# Patient Record
Sex: MALE | Race: WHITE | Employment: UNEMPLOYED | URBAN - METROPOLITAN AREA
[De-identification: names, ages, dates, MRNs, and addresses within clinical notes are randomized per-mention and may not be internally consistent; named-entity substitution may affect disease eponyms.]

---

## 2019-02-27 ENCOUNTER — HOSPITAL ENCOUNTER (OUTPATIENT)
Age: 34
Setting detail: SPECIMEN
Discharge: HOME | End: 2019-02-27
Payer: COMMERCIAL

## 2019-02-27 LAB
ABSOLUTE EOS #: 0.35 K/UL (ref 0–0.44)
ABSOLUTE IMMATURE GRANULOCYTE: 0.04 K/UL (ref 0–0.3)
ABSOLUTE LYMPH #: 2.37 K/UL (ref 1.1–3.7)
ABSOLUTE MONO #: 0.68 K/UL (ref 0.1–1.2)
BASOPHILS # BLD: 1 % (ref 0–2)
BASOPHILS ABSOLUTE: 0.06 K/UL (ref 0–0.2)
DIFFERENTIAL TYPE: ABNORMAL
EOSINOPHILS RELATIVE PERCENT: 4 % (ref 1–4)
HCT VFR BLD CALC: 51.1 % (ref 40.7–50.3)
HEMOGLOBIN: 17.7 G/DL (ref 13–17)
IMMATURE GRANULOCYTES: 0 %
LYMPHOCYTES # BLD: 27 % (ref 24–43)
MCH RBC QN AUTO: 32.4 PG (ref 25.2–33.5)
MCHC RBC AUTO-ENTMCNC: 34.6 G/DL (ref 28.4–34.8)
MCV RBC AUTO: 93.4 FL (ref 82.6–102.9)
MONOCYTES # BLD: 8 % (ref 3–12)
NRBC AUTOMATED: 0 PER 100 WBC
PDW BLD-RTO: 12.4 % (ref 11.8–14.4)
PLATELET # BLD: 306 K/UL (ref 138–453)
PLATELET ESTIMATE: ABNORMAL
PMV BLD AUTO: 11.3 FL (ref 8.1–13.5)
RBC # BLD: 5.47 M/UL (ref 4.21–5.77)
RBC # BLD: ABNORMAL 10*6/UL
SEG NEUTROPHILS: 60 % (ref 36–65)
SEGMENTED NEUTROPHILS ABSOLUTE COUNT: 5.4 K/UL (ref 1.5–8.1)
WBC # BLD: 8.9 K/UL (ref 3.5–11.3)
WBC # BLD: ABNORMAL 10*3/UL

## 2019-02-28 LAB
ALBUMIN SERPL-MCNC: 4.4 G/DL (ref 3.5–5.2)
ALBUMIN/GLOBULIN RATIO: 1.6 (ref 1–2.5)
ALP BLD-CCNC: 92 U/L (ref 40–129)
ALT SERPL-CCNC: 26 U/L (ref 5–41)
ANION GAP SERPL CALCULATED.3IONS-SCNC: 11 MMOL/L (ref 9–17)
AST SERPL-CCNC: 25 U/L
BILIRUB SERPL-MCNC: 1.15 MG/DL (ref 0.3–1.2)
BUN BLDV-MCNC: 15 MG/DL (ref 6–20)
BUN/CREAT BLD: ABNORMAL (ref 9–20)
CALCIUM SERPL-MCNC: 9 MG/DL (ref 8.6–10.4)
CHLORIDE BLD-SCNC: 102 MMOL/L (ref 98–107)
CHOLESTEROL/HDL RATIO: 4.8
CHOLESTEROL: 177 MG/DL
CO2: 26 MMOL/L (ref 20–31)
CREAT SERPL-MCNC: 0.82 MG/DL (ref 0.7–1.2)
GFR AFRICAN AMERICAN: >60 ML/MIN
GFR NON-AFRICAN AMERICAN: >60 ML/MIN
GFR SERPL CREATININE-BSD FRML MDRD: ABNORMAL ML/MIN/{1.73_M2}
GFR SERPL CREATININE-BSD FRML MDRD: ABNORMAL ML/MIN/{1.73_M2}
GLUCOSE BLD-MCNC: 108 MG/DL (ref 70–99)
HDLC SERPL-MCNC: 37 MG/DL
LDL CHOLESTEROL: 112 MG/DL (ref 0–130)
POTASSIUM SERPL-SCNC: 4.3 MMOL/L (ref 3.7–5.3)
SODIUM BLD-SCNC: 139 MMOL/L (ref 135–144)
TOTAL PROTEIN: 7.1 G/DL (ref 6.4–8.3)
TRIGL SERPL-MCNC: 142 MG/DL
TSH SERPL DL<=0.05 MIU/L-ACNC: 2.21 MIU/L (ref 0.3–5)
VLDLC SERPL CALC-MCNC: ABNORMAL MG/DL (ref 1–30)

## 2019-07-30 ENCOUNTER — HOSPITAL ENCOUNTER (OUTPATIENT)
Age: 34
Setting detail: SPECIMEN
Discharge: HOME | End: 2019-07-30
Payer: COMMERCIAL

## 2019-07-30 LAB
ABSOLUTE EOS #: 0.25 K/UL (ref 0–0.44)
ABSOLUTE IMMATURE GRANULOCYTE: <0.03 K/UL (ref 0–0.3)
ABSOLUTE LYMPH #: 2.33 K/UL (ref 1.1–3.7)
ABSOLUTE MONO #: 0.58 K/UL (ref 0.1–1.2)
BASOPHILS # BLD: 1 % (ref 0–2)
BASOPHILS ABSOLUTE: 0.06 K/UL (ref 0–0.2)
DIFFERENTIAL TYPE: ABNORMAL
EOSINOPHILS RELATIVE PERCENT: 3 % (ref 1–4)
HCT VFR BLD CALC: 50.2 % (ref 40.7–50.3)
HEMOGLOBIN: 17.1 G/DL (ref 13–17)
IMMATURE GRANULOCYTES: 0 %
LYMPHOCYTES # BLD: 25 % (ref 24–43)
MCH RBC QN AUTO: 31.5 PG (ref 25.2–33.5)
MCHC RBC AUTO-ENTMCNC: 34.1 G/DL (ref 28.4–34.8)
MCV RBC AUTO: 92.6 FL (ref 82.6–102.9)
MONOCYTES # BLD: 6 % (ref 3–12)
NRBC AUTOMATED: 0 PER 100 WBC
PDW BLD-RTO: 11.9 % (ref 11.8–14.4)
PLATELET # BLD: 284 K/UL (ref 138–453)
PLATELET ESTIMATE: ABNORMAL
PMV BLD AUTO: 11.9 FL (ref 8.1–13.5)
RBC # BLD: 5.42 M/UL (ref 4.21–5.77)
RBC # BLD: ABNORMAL 10*6/UL
SEG NEUTROPHILS: 65 % (ref 36–65)
SEGMENTED NEUTROPHILS ABSOLUTE COUNT: 5.92 K/UL (ref 1.5–8.1)
WBC # BLD: 9.2 K/UL (ref 3.5–11.3)
WBC # BLD: ABNORMAL 10*3/UL

## 2019-07-31 LAB
ALBUMIN SERPL-MCNC: 4.6 G/DL (ref 3.5–5.2)
ALBUMIN/GLOBULIN RATIO: 1.5 (ref 1–2.5)
ALP BLD-CCNC: 83 U/L (ref 40–129)
ALT SERPL-CCNC: 18 U/L (ref 5–41)
ANION GAP SERPL CALCULATED.3IONS-SCNC: 11 MMOL/L (ref 9–17)
AST SERPL-CCNC: 19 U/L
BILIRUB SERPL-MCNC: 1.15 MG/DL (ref 0.3–1.2)
BUN BLDV-MCNC: 12 MG/DL (ref 6–20)
BUN/CREAT BLD: ABNORMAL (ref 9–20)
CALCIUM SERPL-MCNC: 9.4 MG/DL (ref 8.6–10.4)
CHLORIDE BLD-SCNC: 103 MMOL/L (ref 98–107)
CHOLESTEROL, FASTING: 173 MG/DL
CHOLESTEROL/HDL RATIO: 4.8
CO2: 27 MMOL/L (ref 20–31)
CREAT SERPL-MCNC: 0.74 MG/DL (ref 0.7–1.2)
GFR AFRICAN AMERICAN: >60 ML/MIN
GFR NON-AFRICAN AMERICAN: >60 ML/MIN
GFR SERPL CREATININE-BSD FRML MDRD: ABNORMAL ML/MIN/{1.73_M2}
GFR SERPL CREATININE-BSD FRML MDRD: ABNORMAL ML/MIN/{1.73_M2}
GLUCOSE BLD-MCNC: 162 MG/DL (ref 70–99)
HDLC SERPL-MCNC: 36 MG/DL
LDL CHOLESTEROL: 108 MG/DL (ref 0–130)
POTASSIUM SERPL-SCNC: 4.1 MMOL/L (ref 3.7–5.3)
SODIUM BLD-SCNC: 141 MMOL/L (ref 135–144)
TOTAL PROTEIN: 7.6 G/DL (ref 6.4–8.3)
TRIGLYCERIDE, FASTING: 145 MG/DL
TSH SERPL DL<=0.05 MIU/L-ACNC: 0.85 MIU/L (ref 0.3–5)
VLDLC SERPL CALC-MCNC: ABNORMAL MG/DL (ref 1–30)

## 2021-06-22 ENCOUNTER — HOSPITAL ENCOUNTER (OUTPATIENT)
Age: 36
Setting detail: SPECIMEN
Discharge: HOME | End: 2021-06-22
Payer: COMMERCIAL

## 2021-06-24 LAB
CULTURE: ABNORMAL
DIRECT EXAM: ABNORMAL
Lab: ABNORMAL
SPECIMEN DESCRIPTION: ABNORMAL

## 2021-07-29 ENCOUNTER — TELEPHONE (OUTPATIENT)
Dept: SURGERY | Age: 36
End: 2021-07-29

## 2021-08-03 ENCOUNTER — OFFICE VISIT (OUTPATIENT)
Dept: SURGERY | Age: 36
End: 2021-08-03
Payer: COMMERCIAL

## 2021-08-03 VITALS
RESPIRATION RATE: 15 BRPM | WEIGHT: 249 LBS | DIASTOLIC BLOOD PRESSURE: 90 MMHG | TEMPERATURE: 97.2 F | HEIGHT: 74 IN | OXYGEN SATURATION: 100 % | HEART RATE: 77 BPM | BODY MASS INDEX: 31.95 KG/M2 | SYSTOLIC BLOOD PRESSURE: 122 MMHG

## 2021-08-03 DIAGNOSIS — K43.2 INCISIONAL HERNIA, WITHOUT OBSTRUCTION OR GANGRENE: Primary | ICD-10-CM

## 2021-08-03 DIAGNOSIS — Z01.818 PRE-OP TESTING: ICD-10-CM

## 2021-08-03 PROCEDURE — G8417 CALC BMI ABV UP PARAM F/U: HCPCS | Performed by: SURGERY

## 2021-08-03 PROCEDURE — G8427 DOCREV CUR MEDS BY ELIG CLIN: HCPCS | Performed by: SURGERY

## 2021-08-03 PROCEDURE — 99244 OFF/OP CNSLTJ NEW/EST MOD 40: CPT | Performed by: SURGERY

## 2021-08-03 RX ORDER — IBUPROFEN 200 MG
200 TABLET ORAL EVERY 6 HOURS PRN
COMMUNITY

## 2021-08-03 RX ORDER — ONDANSETRON 4 MG/1
4 TABLET, ORALLY DISINTEGRATING ORAL EVERY 8 HOURS PRN
COMMUNITY

## 2021-08-03 RX ORDER — ACETAMINOPHEN 325 MG/1
650 TABLET ORAL EVERY 6 HOURS PRN
COMMUNITY

## 2021-08-03 RX ORDER — TIZANIDINE 4 MG/1
4 TABLET ORAL EVERY 6 HOURS PRN
COMMUNITY

## 2021-08-03 RX ORDER — INSULIN GLARGINE 100 [IU]/ML
INJECTION, SOLUTION SUBCUTANEOUS NIGHTLY
COMMUNITY

## 2021-08-03 ASSESSMENT — ENCOUNTER SYMPTOMS
COUGH: 0
WHEEZING: 0
VOICE CHANGE: 0
NAUSEA: 1
ABDOMINAL DISTENTION: 1
SINUS PRESSURE: 0
VOMITING: 0
CHOKING: 0
STRIDOR: 0
TROUBLE SWALLOWING: 0
CHEST TIGHTNESS: 0
BACK PAIN: 1
PHOTOPHOBIA: 0
EYE PAIN: 0
BLOOD IN STOOL: 0
SHORTNESS OF BREATH: 0
RECTAL PAIN: 0
ABDOMINAL PAIN: 1
FACIAL SWELLING: 0
EYE ITCHING: 0
SINUS PAIN: 0
EYE REDNESS: 0
ANAL BLEEDING: 0
RHINORRHEA: 0
EYE DISCHARGE: 0
APNEA: 0
CONSTIPATION: 0
DIARRHEA: 0
COLOR CHANGE: 0
SORE THROAT: 0

## 2021-08-03 NOTE — PROGRESS NOTES
Asia Perdomo. [facility], [address] [place] [address] [address] [place] [id number]  [phone]  New Patient Evaluation in Office    Pt Name: Susi Khoury  Date of Birth 1985   Today's Date: 8/3/2021  Medical Record Number: [id number]  Referring Provider: MARY Mahmood *  Primary Care Provider: MARY Barnes - NP  Chief Complaint   Patient presents with   [facility]     new patient--ref by GEN Grubbs for umbilical hernia     ASSESSMENT       Diagnosis Orders   1. Incisional hernia, without obstruction or gangrene  LAPAROSCOPY REPAIR HERNIA UMBILICAL / VENTRAL / INCISIONAL   2. Pre-op testing       Past Medical History:   Diagnosis Date    Seizures ([address]     Type 2 diabetes mellitus ([facility] [address]           PLANS      1. Schedule Dennis Isaacs for L/S Incisional hernia repair  2. The risks, options and alternatives were discussed in the office. Bleeding, infection, reoperation and recurrence were discussed. Injury to other intra abdominal structures/organs and possible conversion to an open procedure as well as the risks of mesh infection were covered. The patient was given the opportunity to ask questions. Once answered, the patient was agreeable to proceed with the surgery. 3. Status: outpatient  4. Planned anesthesia: general  5. He will undergo pre-operative clearance per anesthesia guidelines with risk factors listed under the past medical history diagnosis & problem list.     Giovanna Goins is a 31 y.o.male who presents with abdominal pain. The pain is described as dull, and is moderate in intensity. Pain is located in the epigastric with radiation to the Left upper quadrant. He says that the pain becomes sharp with lifting and movement to an 8/10 pain. Onset was 2 months ago. Symptoms have been gradually worsening since. Aggravating factors include activity. Alleviating factors include none. Associated symptoms include nausea and heartburn. He admits to history of fevers, umbilical discharge, past hiatal hernia surgery and past tumor removal surgeries. Previous studies include CT scan which was essentially unremarkable. Past Medical History  Past Medical History:   Diagnosis Date    Seizures ([facility] [address]     Type 2 diabetes mellitus ([facility] [address]      Past Surgical History  Past Surgical History:   Procedure Laterality Date    FOOT SURGERY Right     benign mass    GASTRIC FUNDOPLICATION  20/61/2856    Dr. Amilcar Felipe (in care everywhere)    OTHER SURGICAL HISTORY      left arm mass removed-- Dr. Amilcar Felipe     Medications  Current Outpatient Medications   Medication Sig Dispense Refill    insulin glargine (LANTUS) 100 UNIT/ML injection vial Inject into the skin nightly 42 in morning 32 at night      insulin lispro (HUMALOG) 100 UNIT/ML injection vial Inject into the skin 3 times daily (before meals)      tiZANidine (ZANAFLEX) 4 MG tablet Take 4 mg by mouth every 6 hours as needed      ondansetron (ZOFRAN-ODT) 4 MG disintegrating tablet Take 4 mg by mouth every 8 hours as needed for Nausea or Vomiting      acetaminophen (TYLENOL) 325 MG tablet Take 650 mg by mouth every 6 hours as needed for Pain      ibuprofen (ADVIL;MOTRIN) 200 MG tablet Take 200 mg by mouth every 6 hours as needed for Pain       No current facility-administered medications for this visit. Allergies  is allergic to dust mite extract, mixed grasses, and molds & smuts. Family History  family history is not on file. Social History   reports that he has never smoked. He has never used smokeless tobacco. He reports that he does not drink alcohol and does not use drugs.   Health Screening Exams  Health Maintenance   Topic Date Due    Hepatitis C screen  Never done    Varicella vaccine (1 of 2 - 2-dose childhood series) Never done    COVID-19 Vaccine (1) Never done    HIV screen  Never done    DTaP/Tdap/Td vaccine (1 - Tdap) Never done    Flu vaccine (1) 09/01/2021    Hepatitis A vaccine  Aged Out  Hepatitis B vaccine  Aged Out    Hib vaccine  Aged Out    Meningococcal (ACWY) vaccine  Aged Out    Pneumococcal 0-64 years Vaccine  Aged Out     Review of Systems  Constitutional: Positive for fever (intermittent). Negative for activity change, appetite change, chills, diaphoresis, fatigue and unexpected weight change. HENT: Negative for congestion, dental problem, drooling, ear discharge, ear pain, facial swelling, hearing loss, mouth sores, nosebleeds, postnasal drip, rhinorrhea, sinus pressure, sinus pain, sneezing, sore throat, tinnitus, trouble swallowing and voice change. Eyes: Negative for photophobia, pain, discharge, redness, itching and visual disturbance. Respiratory: Negative for apnea, cough, choking, chest tightness, shortness of breath, wheezing and stridor. Cardiovascular: Negative for chest pain, palpitations and leg swelling. Gastrointestinal: Positive for abdominal distention, abdominal pain (umbilical/ left lower) and nausea. Negative for anal bleeding, blood in stool, constipation, diarrhea, rectal pain and vomiting. Endocrine: Negative for cold intolerance, heat intolerance, polydipsia, polyphagia and polyuria. Genitourinary: Negative for decreased urine volume, difficulty urinating, discharge, dysuria, enuresis, flank pain, frequency, genital sores, hematuria, penile pain, penile swelling, scrotal swelling, testicular pain and urgency. Musculoskeletal: Positive for back pain (spinal fractures per pt). Negative for arthralgias, gait problem, joint swelling, myalgias, neck pain and neck stiffness. Skin: Negative for color change, pallor, rash and wound. Allergic/Immunologic: Negative for environmental allergies, food allergies and immunocompromised state. Neurological: Positive for seizures (stress induced). Negative for dizziness, tremors, syncope, facial asymmetry, speech difficulty, weakness, light-headedness, numbness and headaches.    Hematological: Negative for adenopathy. Does not bruise/bleed easily. Psychiatric/Behavioral: Negative for agitation, behavioral problems, confusion, decreased concentration, dysphoric mood, hallucinations, self-injury, sleep disturbance and suicidal ideas. The patient is not nervous/anxious and is not hyperactive. OBJECTIVE    VITALS:  height is 6' 2\" (1.88 m) and weight is 249 lb (112.9 kg). His tympanic temperature is 97.2 °F (36.2 °C). His blood pressure is 122/90 (abnormal) and his pulse is 77. His respiration is 15 and oxygen saturation is 100%. Pain Score:   8 Body mass index is 31.97 kg/m². CONSTITUTIONAL: Alert and oriented times 3, no acute distress and cooperative to examination with proper mood and affect. SKIN: Skin color, texture, turgor normal. No rashes or lesions. LYMPH: no cervical nodes, no inguinal nodes  HEENT: Head is normocephalic, atraumatic. EOMI, PERRLA. NECK: Supple, symmetrical, trachea midline, no adenopathy, thyroid symmetric, not enlarged and no tenderness, skin normal.  CHEST/LUNGS: chest symmetric with normal A/P diameter, normal respiratory rate and rhythm, lungs clear to auscultation without wheezes, rales or rhonchi. No accessory muscle use. Scars None   CARDIOVASCULAR: Heart sounds are normal.  Regular rate and rhythm without murmur, gallop or rub. Normal S1 and S2. Carotid and femoral pulses 2+/4 and equal bilaterally. ABDOMEN: Normal shape. Laparoscopic scar(s) present. Normal bowel sounds. No bruits. soft, nondistended, no masses or organomegaly. incisional hernia is present at the supraumbilical port site which is reducible. Percussion: Normal without hepatosplenomegally. RECTAL: deferred, not clinically indicated  NEUROLOGIC: There are no focalizing motor or sensory deficits. CN II-XII are grossly intact. Taryn Evanston EXTREMITIES: no cyanosis, no clubbing and no edema. Thank you for the interesting evaluation. Further recommendations as listed above.        Electronically signed by David Eli, DO on 8/3/2021 at 12:52 PM

## 2021-08-03 NOTE — PROGRESS NOTES
Pt evaluation and documentation discussed with the student. Please see separate attending note for my independent evaluation and management recommendations. Electronically signed by Elizabeth Kirkland DO on 8/3/2021 at 12:50 PM         **This is a Medical/ PA/ APRN Student Note and is charted for educational purposes. The non-physician staff attested note is not to be used for billing purposes or to guide patient care. Please see the physician modifications/ attestation for treatment plan/suggestions. This note has been reviewed and feedback has been provided to the student. **    [address] Surgery History & Physical  Jenny Amin  On behalf of Dr. Florence Xavier    Pt Name: Nino Cantu  MRN: [id number]  YOB: 1985  Date of evaluation: 8/3/2021  Primary Care Physician: MARY Ruiz - NP  Patient evaluated at the request of  Dr. Sabrina Farrell for evaluation: Abdominal pain  IMPRESSIONS   1. Incisional hernia and abdominal pain. 2.  has a past medical history of Seizures ([address] and Type 2 diabetes mellitus ([address] PLANS     SUBJECTIVE   History of Chief Complaint:    Elpidio Rodriguez is a 31 y.o.male who presents with abdominal pain. The pain is described as dull, and is moderate in intensity. Pain is located in the epigastric with radiation to the Left upper quadrant. He says that the pain becomes sharp with lifting and movement to an 8/10 pain. Onset was 2 months ago. Symptoms have been gradually worsening since. Aggravating factors include activity. Alleviating factors include none. Associated symptoms include nausea and heartburn. He admits to history of fevers, umbilical discharge, past hiatal hernia surgery and past tumor removal surgeries. Previous studies include CT scan. Past Medical History   has a past medical history of Seizures ([address] and Type 2 diabetes mellitus ([address]   Past Surgical History   has a past surgical history that includes Gastric fundoplication (33/17/5895); other surgical history; and Foot surgery (Right). Medications  Prior to Admission medications    Medication Sig Start Date End Date Taking? Authorizing Provider   insulin glargine (LANTUS) 100 UNIT/ML injection vial Inject into the skin nightly 42 in morning 32 at night   Yes Historical Provider, MD   insulin lispro (HUMALOG) 100 UNIT/ML injection vial Inject into the skin 3 times daily (before meals)   Yes Historical Provider, MD   tiZANidine (ZANAFLEX) 4 MG tablet Take 4 mg by mouth every 6 hours as needed   Yes Historical Provider, MD   ondansetron (ZOFRAN-ODT) 4 MG disintegrating tablet Take 4 mg by mouth every 8 hours as needed for Nausea or Vomiting   Yes Historical Provider, MD   acetaminophen (TYLENOL) 325 MG tablet Take 650 mg by mouth every 6 hours as needed for Pain   Yes Historical Provider, MD   ibuprofen (ADVIL;MOTRIN) 200 MG tablet Take 200 mg by mouth every 6 hours as needed for Pain   Yes Historical Provider, MD    Scheduled Meds:  Continuous Infusions:  PRN Meds:. Allergies  is allergic to dust mite extract, mixed grasses, and molds & smuts. Family History  family history is not on file. Social History   reports that he has never smoked. He has never used smokeless tobacco. He reports that he does not drink alcohol and does not use drugs. Review of Systems:  General positive for  fevers  Denies any fever or chills  HEENT Denies any diplopia, tinnitus or vertigo  Resp Denies any shortness of breath, cough or wheezing  Cardiac Denies any chest pain, palpitations, claudication or edema  GI Positive for reflux and dysphagia  Denies any melena, hematochezia, hematemesis or pyrosis  Endocrine positive for diabetic symptoms including none and nausea, Denies any history of diabetes or thyroid disease  OBJECTIVE   CURRENT VITALS:  height is 6' 2\" (1.88 m) and weight is 249 lb (112.9 kg). His tympanic temperature is 97.2 °F (36.2 °C). His blood pressure is 122/90 (abnormal) and his pulse is 77.  His respiration is 15 and oxygen saturation is 100%. Body mass index is 31.97 kg/m². Temperature Range (24h):Temp: 97.2 °F (36.2 °C) Temp  Av.2 °F (36.2 °C)  Min: 97.2 °F (36.2 °C)  Max: 97.2 °F (36.2 °C)  BP Range (45F): Systolic (32UEK), INZ:066 , Min:122 , NUP:823     Diastolic (26VAW), SNP:32, Min:90, Max:90    Pulse Range (24h): Pulse  Av  Min: 77  Max: 77  Respiration Range (24h): Resp  Avg: 15  Min: 15  Max: 15  Current Pulse Ox (24h):  SpO2: 100 %  Pulse Ox Range (24h):  SpO2  Av %  Min: 100 %  Max: 100 %  Oxygen Amount and Delivery:    CONSTITUTIONAL: Alert and oriented times 3, no acute distress and cooperative to examination with proper mood and affect. SKIN: Skin color, texture, turgor normal. No rashes or lesions. , positives: scar - arms, abdomen  LYMPH: no cervical nodes, no inguinal nodes  NECK: skin normal.  CHEST/LUNGS: chest symmetric with normal A/P diameter, normal respiratory rate and rhythm, lungs clear to auscultation without wheezes, rales or rhonchi. No accessory muscle use. Scars None   CARDIOVASCULAR: Heart sounds are normal.  Regular rate and rhythm without murmur, gallop or rub. Normal S1 and S2. Carotid and femoral pulses 2+/4 and equal bilaterally. ABDOMEN: obese No and Laparoscopic scar(s) present. Normal bowel sounds. No bruits. Soft, nondistended, no masses or organomegaly. ventral hernia, at supraumbilical port site. Percussion: Normal without hepatosplenomegally. Tenderness: absent. RECTAL: deferred, not clinically indicated  NEUROLOGIC: There are no focalizing motor or sensory deficits. CN II-XII are grossly intact. Rawleigh Billing EXTREMITIES: no cyanosis, no clubbing and no edema. LABS   No results for input(s): WBC, HGB, HCT, PLT, NA, K, CL, CO2, BUN, CREATININE, MG, PHOS, CALCIUM, PTT, INR, AST, ALT, BILITOT, BILIDIR, AMYLASE, LIPASE, LDH, LACTA, NITRU, COLORU, BACTERIA in the last 72 hours.     Invalid input(s): PT, WBCU, RBCU, LEUKOCYTESUA  RADIOLOGY     No orders to display Thank you for the interesting evaluation. Further recommendations to follow. Electronically signed by Lindy Arrieta on 8/3/2021 at 10:03 AM        **This is a Medical/ PA/ APRN Student Note and is charted for educational purposes. The non-physician staff attested note is not to be used for billing purposes or to guide patient care. Please see the physician modifications/ attestation for treatment plan/suggestions. This note has been reviewed and feedback has been provided to the student.  **

## 2021-08-03 NOTE — PROGRESS NOTES
Subjective:      Patient ID: Ezra Teague is a 31 y.o. male. HPI    Review of Systems   Constitutional: Positive for fever (intermittent). Negative for activity change, appetite change, chills, diaphoresis, fatigue and unexpected weight change. HENT: Negative for congestion, dental problem, drooling, ear discharge, ear pain, facial swelling, hearing loss, mouth sores, nosebleeds, postnasal drip, rhinorrhea, sinus pressure, sinus pain, sneezing, sore throat, tinnitus, trouble swallowing and voice change. Eyes: Negative for photophobia, pain, discharge, redness, itching and visual disturbance. Respiratory: Negative for apnea, cough, choking, chest tightness, shortness of breath, wheezing and stridor. Cardiovascular: Negative for chest pain, palpitations and leg swelling. Gastrointestinal: Positive for abdominal distention, abdominal pain (umbilical/ left lower) and nausea. Negative for anal bleeding, blood in stool, constipation, diarrhea, rectal pain and vomiting. Endocrine: Negative for cold intolerance, heat intolerance, polydipsia, polyphagia and polyuria. Genitourinary: Negative for decreased urine volume, difficulty urinating, discharge, dysuria, enuresis, flank pain, frequency, genital sores, hematuria, penile pain, penile swelling, scrotal swelling, testicular pain and urgency. Musculoskeletal: Positive for back pain (spinal fractures per pt). Negative for arthralgias, gait problem, joint swelling, myalgias, neck pain and neck stiffness. Skin: Negative for color change, pallor, rash and wound. Allergic/Immunologic: Negative for environmental allergies, food allergies and immunocompromised state. Neurological: Positive for seizures (stress induced). Negative for dizziness, tremors, syncope, facial asymmetry, speech difficulty, weakness, light-headedness, numbness and headaches. Hematological: Negative for adenopathy. Does not bruise/bleed easily.    Psychiatric/Behavioral: Negative for agitation, behavioral problems, confusion, decreased concentration, dysphoric mood, hallucinations, self-injury, sleep disturbance and suicidal ideas. The patient is not nervous/anxious and is not hyperactive.         Objective:   Physical Exam    Assessment:            Plan:              Gillian Arreola

## 2021-08-11 ENCOUNTER — ANESTHESIA (OUTPATIENT)
Dept: OPERATING ROOM | Age: 36
End: 2021-08-11
Payer: COMMERCIAL

## 2021-08-11 ENCOUNTER — HOSPITAL ENCOUNTER (OUTPATIENT)
Age: 36
Setting detail: OUTPATIENT SURGERY
Discharge: HOME | End: 2021-08-11
Attending: SURGERY | Admitting: SURGERY
Payer: COMMERCIAL

## 2021-08-11 ENCOUNTER — ANESTHESIA EVENT (OUTPATIENT)
Dept: OPERATING ROOM | Age: 36
End: 2021-08-11
Payer: COMMERCIAL

## 2021-08-11 VITALS
WEIGHT: 251 LBS | HEIGHT: 74 IN | RESPIRATION RATE: 20 BRPM | HEART RATE: 85 BPM | BODY MASS INDEX: 32.21 KG/M2 | TEMPERATURE: 96.2 F | OXYGEN SATURATION: 97 % | DIASTOLIC BLOOD PRESSURE: 66 MMHG | SYSTOLIC BLOOD PRESSURE: 119 MMHG

## 2021-08-11 VITALS — DIASTOLIC BLOOD PRESSURE: 61 MMHG | SYSTOLIC BLOOD PRESSURE: 115 MMHG | OXYGEN SATURATION: 96 %

## 2021-08-11 DIAGNOSIS — G89.18 ACUTE POSTOPERATIVE PAIN: Primary | ICD-10-CM

## 2021-08-11 LAB
GLUCOSE BLD-MCNC: 176 MG/DL (ref 70–108)
GLUCOSE BLD-MCNC: 192 MG/DL (ref 70–108)
GLUCOSE BLD-MCNC: 264 MG/DL (ref 70–108)

## 2021-08-11 PROCEDURE — 2500000003 HC RX 250 WO HCPCS: Performed by: SURGERY

## 2021-08-11 PROCEDURE — 2709999900 HC NON-CHARGEABLE SUPPLY: Performed by: SURGERY

## 2021-08-11 PROCEDURE — 7100000000 HC PACU RECOVERY - FIRST 15 MIN: Performed by: SURGERY

## 2021-08-11 PROCEDURE — 7100000010 HC PHASE II RECOVERY - FIRST 15 MIN: Performed by: SURGERY

## 2021-08-11 PROCEDURE — 7100000001 HC PACU RECOVERY - ADDTL 15 MIN: Performed by: SURGERY

## 2021-08-11 PROCEDURE — 82948 REAGENT STRIP/BLOOD GLUCOSE: CPT

## 2021-08-11 PROCEDURE — 6370000000 HC RX 637 (ALT 250 FOR IP): Performed by: SURGERY

## 2021-08-11 PROCEDURE — C1713 ANCHOR/SCREW BN/BN,TIS/BN: HCPCS | Performed by: SURGERY

## 2021-08-11 PROCEDURE — 3700000001 HC ADD 15 MINUTES (ANESTHESIA): Performed by: SURGERY

## 2021-08-11 PROCEDURE — 3700000000 HC ANESTHESIA ATTENDED CARE: Performed by: SURGERY

## 2021-08-11 PROCEDURE — 6360000002 HC RX W HCPCS: Performed by: REGISTERED NURSE

## 2021-08-11 PROCEDURE — 3600000004 HC SURGERY LEVEL 4 BASE: Performed by: SURGERY

## 2021-08-11 PROCEDURE — 49652 PR LAP, VENTRAL HERNIA REPAIR,REDUCIBLE: CPT | Performed by: SURGERY

## 2021-08-11 PROCEDURE — 2500000003 HC RX 250 WO HCPCS: Performed by: REGISTERED NURSE

## 2021-08-11 PROCEDURE — 6360000002 HC RX W HCPCS: Performed by: SURGERY

## 2021-08-11 PROCEDURE — 6370000000 HC RX 637 (ALT 250 FOR IP): Performed by: REGISTERED NURSE

## 2021-08-11 PROCEDURE — 6360000002 HC RX W HCPCS: Performed by: ANESTHESIOLOGY

## 2021-08-11 PROCEDURE — 7100000011 HC PHASE II RECOVERY - ADDTL 15 MIN: Performed by: SURGERY

## 2021-08-11 PROCEDURE — 3600000014 HC SURGERY LEVEL 4 ADDTL 15MIN: Performed by: SURGERY

## 2021-08-11 PROCEDURE — 2580000003 HC RX 258: Performed by: SURGERY

## 2021-08-11 PROCEDURE — C1781 MESH (IMPLANTABLE): HCPCS | Performed by: SURGERY

## 2021-08-11 DEVICE — PATCH HERN M DIA2.5IN CIR W/ STRP SEPRA TECHNOLOGY ABSRB: Type: IMPLANTABLE DEVICE | Status: FUNCTIONAL

## 2021-08-11 DEVICE — DEVICE FIX L37CM PEEK SMOOTH CANN 30 ABSRB FAST FOR LAP: Type: IMPLANTABLE DEVICE | Status: FUNCTIONAL

## 2021-08-11 RX ORDER — BUPIVACAINE HYDROCHLORIDE 5 MG/ML
INJECTION, SOLUTION PERINEURAL PRN
Status: DISCONTINUED | OUTPATIENT
Start: 2021-08-11 | End: 2021-08-11 | Stop reason: ALTCHOICE

## 2021-08-11 RX ORDER — HYDROCODONE BITARTRATE AND ACETAMINOPHEN 5; 325 MG/1; MG/1
1 TABLET ORAL EVERY 4 HOURS PRN
Status: DISCONTINUED | OUTPATIENT
Start: 2021-08-11 | End: 2021-08-11 | Stop reason: HOSPADM

## 2021-08-11 RX ORDER — SODIUM CHLORIDE 0.9 % (FLUSH) 0.9 %
5-40 SYRINGE (ML) INJECTION EVERY 12 HOURS SCHEDULED
Status: DISCONTINUED | OUTPATIENT
Start: 2021-08-11 | End: 2021-08-11 | Stop reason: HOSPADM

## 2021-08-11 RX ORDER — ONDANSETRON 2 MG/ML
INJECTION INTRAMUSCULAR; INTRAVENOUS PRN
Status: DISCONTINUED | OUTPATIENT
Start: 2021-08-11 | End: 2021-08-11 | Stop reason: SDUPTHER

## 2021-08-11 RX ORDER — FENTANYL CITRATE 50 UG/ML
50 INJECTION, SOLUTION INTRAMUSCULAR; INTRAVENOUS EVERY 5 MIN PRN
Status: DISCONTINUED | OUTPATIENT
Start: 2021-08-11 | End: 2021-08-11 | Stop reason: HOSPADM

## 2021-08-11 RX ORDER — LABETALOL 20 MG/4 ML (5 MG/ML) INTRAVENOUS SYRINGE
5 EVERY 10 MIN PRN
Status: DISCONTINUED | OUTPATIENT
Start: 2021-08-11 | End: 2021-08-11 | Stop reason: HOSPADM

## 2021-08-11 RX ORDER — SODIUM CHLORIDE 9 MG/ML
25 INJECTION, SOLUTION INTRAVENOUS PRN
Status: DISCONTINUED | OUTPATIENT
Start: 2021-08-11 | End: 2021-08-11 | Stop reason: HOSPADM

## 2021-08-11 RX ORDER — KETOROLAC TROMETHAMINE 30 MG/ML
INJECTION, SOLUTION INTRAMUSCULAR; INTRAVENOUS PRN
Status: DISCONTINUED | OUTPATIENT
Start: 2021-08-11 | End: 2021-08-11 | Stop reason: SDUPTHER

## 2021-08-11 RX ORDER — OMEPRAZOLE 20 MG/1
40 CAPSULE, DELAYED RELEASE ORAL DAILY
COMMUNITY

## 2021-08-11 RX ORDER — ONDANSETRON 2 MG/ML
4 INJECTION INTRAMUSCULAR; INTRAVENOUS
Status: DISCONTINUED | OUTPATIENT
Start: 2021-08-11 | End: 2021-08-11 | Stop reason: HOSPADM

## 2021-08-11 RX ORDER — SODIUM CHLORIDE 9 MG/ML
INJECTION, SOLUTION INTRAVENOUS CONTINUOUS
Status: DISCONTINUED | OUTPATIENT
Start: 2021-08-11 | End: 2021-08-11 | Stop reason: HOSPADM

## 2021-08-11 RX ORDER — HYDROCODONE BITARTRATE AND ACETAMINOPHEN 5; 325 MG/1; MG/1
1 TABLET ORAL EVERY 4 HOURS PRN
Qty: 30 TABLET | Refills: 0 | Status: SHIPPED | OUTPATIENT
Start: 2021-08-11 | End: 2021-08-16

## 2021-08-11 RX ORDER — FENTANYL CITRATE 50 UG/ML
25 INJECTION, SOLUTION INTRAMUSCULAR; INTRAVENOUS EVERY 5 MIN PRN
Status: DISCONTINUED | OUTPATIENT
Start: 2021-08-11 | End: 2021-08-11 | Stop reason: HOSPADM

## 2021-08-11 RX ORDER — ONDANSETRON 2 MG/ML
4 INJECTION INTRAMUSCULAR; INTRAVENOUS EVERY 6 HOURS PRN
Status: DISCONTINUED | OUTPATIENT
Start: 2021-08-11 | End: 2021-08-11 | Stop reason: HOSPADM

## 2021-08-11 RX ORDER — PROPOFOL 10 MG/ML
INJECTION, EMULSION INTRAVENOUS PRN
Status: DISCONTINUED | OUTPATIENT
Start: 2021-08-11 | End: 2021-08-11 | Stop reason: SDUPTHER

## 2021-08-11 RX ORDER — SUCCINYLCHOLINE CHLORIDE 20 MG/ML
INJECTION INTRAMUSCULAR; INTRAVENOUS PRN
Status: DISCONTINUED | OUTPATIENT
Start: 2021-08-11 | End: 2021-08-11 | Stop reason: SDUPTHER

## 2021-08-11 RX ORDER — DEXAMETHASONE SODIUM PHOSPHATE 4 MG/ML
INJECTION, SOLUTION INTRA-ARTICULAR; INTRALESIONAL; INTRAMUSCULAR; INTRAVENOUS; SOFT TISSUE PRN
Status: DISCONTINUED | OUTPATIENT
Start: 2021-08-11 | End: 2021-08-11 | Stop reason: SDUPTHER

## 2021-08-11 RX ORDER — SODIUM CHLORIDE 0.9 % (FLUSH) 0.9 %
5-40 SYRINGE (ML) INJECTION PRN
Status: DISCONTINUED | OUTPATIENT
Start: 2021-08-11 | End: 2021-08-11 | Stop reason: HOSPADM

## 2021-08-11 RX ORDER — LIDOCAINE HYDROCHLORIDE 40 MG/ML
SOLUTION TOPICAL PRN
Status: DISCONTINUED | OUTPATIENT
Start: 2021-08-11 | End: 2021-08-11 | Stop reason: SDUPTHER

## 2021-08-11 RX ORDER — PROMETHAZINE HYDROCHLORIDE 25 MG/ML
6.25 INJECTION, SOLUTION INTRAMUSCULAR; INTRAVENOUS
Status: DISCONTINUED | OUTPATIENT
Start: 2021-08-11 | End: 2021-08-11 | Stop reason: HOSPADM

## 2021-08-11 RX ORDER — METOPROLOL SUCCINATE 25 MG/1
25 TABLET, EXTENDED RELEASE ORAL DAILY
COMMUNITY

## 2021-08-11 RX ORDER — ROCURONIUM BROMIDE 10 MG/ML
INJECTION, SOLUTION INTRAVENOUS PRN
Status: DISCONTINUED | OUTPATIENT
Start: 2021-08-11 | End: 2021-08-11 | Stop reason: SDUPTHER

## 2021-08-11 RX ORDER — FENTANYL CITRATE 50 UG/ML
INJECTION, SOLUTION INTRAMUSCULAR; INTRAVENOUS PRN
Status: DISCONTINUED | OUTPATIENT
Start: 2021-08-11 | End: 2021-08-11 | Stop reason: SDUPTHER

## 2021-08-11 RX ADMIN — SUCCINYLCHOLINE CHLORIDE 160 MG: 20 INJECTION, SOLUTION INTRAMUSCULAR; INTRAVENOUS at 09:42

## 2021-08-11 RX ADMIN — FENTANYL CITRATE 50 MCG: 50 INJECTION, SOLUTION INTRAMUSCULAR; INTRAVENOUS at 10:39

## 2021-08-11 RX ADMIN — LIDOCAINE HYDROCHLORIDE 4 ML: 40 SOLUTION TOPICAL at 09:42

## 2021-08-11 RX ADMIN — SODIUM CHLORIDE: 9 INJECTION, SOLUTION INTRAVENOUS at 09:02

## 2021-08-11 RX ADMIN — KETOROLAC TROMETHAMINE 30 MG: 30 INJECTION, SOLUTION INTRAMUSCULAR; INTRAVENOUS at 10:14

## 2021-08-11 RX ADMIN — HYDROCODONE BITARTRATE AND ACETAMINOPHEN 1 TABLET: 5; 325 TABLET ORAL at 11:16

## 2021-08-11 RX ADMIN — CEFAZOLIN 2000 MG: 10 INJECTION, POWDER, FOR SOLUTION INTRAVENOUS at 09:44

## 2021-08-11 RX ADMIN — DEXAMETHASONE SODIUM PHOSPHATE 10 MG: 4 INJECTION, SOLUTION INTRAMUSCULAR; INTRAVENOUS at 09:42

## 2021-08-11 RX ADMIN — FENTANYL CITRATE 50 MCG: 50 INJECTION, SOLUTION INTRAMUSCULAR; INTRAVENOUS at 10:30

## 2021-08-11 RX ADMIN — ROCURONIUM BROMIDE 30 MG: 10 INJECTION INTRAVENOUS at 09:56

## 2021-08-11 RX ADMIN — PROPOFOL 200 MG: 10 INJECTION, EMULSION INTRAVENOUS at 09:42

## 2021-08-11 RX ADMIN — ONDANSETRON HYDROCHLORIDE 4 MG: 4 INJECTION, SOLUTION INTRAMUSCULAR; INTRAVENOUS at 09:42

## 2021-08-11 RX ADMIN — FENTANYL CITRATE 100 MCG: 50 INJECTION, SOLUTION INTRAMUSCULAR; INTRAVENOUS at 09:42

## 2021-08-11 RX ADMIN — SUGAMMADEX 200 MG: 100 INJECTION, SOLUTION INTRAVENOUS at 10:16

## 2021-08-11 ASSESSMENT — PULMONARY FUNCTION TESTS
PIF_VALUE: 3
PIF_VALUE: 15
PIF_VALUE: 8
PIF_VALUE: 23
PIF_VALUE: 16
PIF_VALUE: 16
PIF_VALUE: 23
PIF_VALUE: 23
PIF_VALUE: 16
PIF_VALUE: 18
PIF_VALUE: 23
PIF_VALUE: 16
PIF_VALUE: 23
PIF_VALUE: 23
PIF_VALUE: 16
PIF_VALUE: 23
PIF_VALUE: 2
PIF_VALUE: 2
PIF_VALUE: 23
PIF_VALUE: 1
PIF_VALUE: 21
PIF_VALUE: 18
PIF_VALUE: 3
PIF_VALUE: 6
PIF_VALUE: 16
PIF_VALUE: 23
PIF_VALUE: 8
PIF_VALUE: 16
PIF_VALUE: 20
PIF_VALUE: 16
PIF_VALUE: 23
PIF_VALUE: 18
PIF_VALUE: 24
PIF_VALUE: 18
PIF_VALUE: 23
PIF_VALUE: 11
PIF_VALUE: 16
PIF_VALUE: 23
PIF_VALUE: 1
PIF_VALUE: 6
PIF_VALUE: 1
PIF_VALUE: 15

## 2021-08-11 ASSESSMENT — PAIN SCALES - GENERAL
PAINLEVEL_OUTOF10: 8
PAINLEVEL_OUTOF10: 5
PAINLEVEL_OUTOF10: 6
PAINLEVEL_OUTOF10: 8
PAINLEVEL_OUTOF10: 6
PAINLEVEL_OUTOF10: 8
PAINLEVEL_OUTOF10: 6
PAINLEVEL_OUTOF10: 8
PAINLEVEL_OUTOF10: 6
PAINLEVEL_OUTOF10: 8

## 2021-08-11 ASSESSMENT — PAIN DESCRIPTION - DESCRIPTORS: DESCRIPTORS: SORE;ACHING

## 2021-08-11 ASSESSMENT — PAIN DESCRIPTION - ORIENTATION: ORIENTATION: LEFT

## 2021-08-11 ASSESSMENT — PAIN DESCRIPTION - LOCATION: LOCATION: ABDOMEN

## 2021-08-11 ASSESSMENT — PAIN DESCRIPTION - PAIN TYPE: TYPE: ACUTE PAIN

## 2021-08-11 NOTE — PROGRESS NOTES
Pt has met discharge criteria and states he is ready for discharge to home. IV removed, gauze and tape applied. Dressed in own clothes and personal belongings gathered. Discharge instructions (with opioid medication education information) given to pt and family; pt and family verbalized understanding of discharge instructions, prescriptions and follow up appointments. Pt transported to discharge lobby by [facility] staff.

## 2021-08-11 NOTE — PROGRESS NOTES
Pt returned to [facility] room 1. Vitals and assessment as charted. 0.9 infusing, @ 950ml to count from PACU. Pt has muffin and water. Family at the bedside. Pt and family verbalized understanding of discharge criteria and call light use. Call light in reach.

## 2021-08-11 NOTE — ANESTHESIA PRE PROCEDURE
Department of Anesthesiology  Preprocedure Note       Name:  Susi Khoury   Age:  31 y.o.  :  1985                                          MRN:  [id number]         Date:  2021      Surgeon: Eliecer Loyola):  Abhay Palma DO    Procedure: Procedure(s):  LAPAROSCOPIC INCISIONAL HERNIA REPAIR WITH MESH, POSS OPEN    Medications prior to admission:   Prior to Admission medications    Medication Sig Start Date End Date Taking?  Authorizing Provider   Cyclobenzaprine HCl (FLEXERIL PO) Take by mouth   Yes Historical Provider, MD   metoprolol succinate (TOPROL XL) 25 MG extended release tablet Take 25 mg by mouth daily   Yes Historical Provider, MD   omeprazole (PRILOSEC) 20 MG delayed release capsule Take 40 mg by mouth daily   Yes Historical Provider, MD   insulin glargine (LANTUS) 100 UNIT/ML injection vial Inject into the skin nightly 42 in morning 32 at night   Yes Historical Provider, MD   insulin lispro (HUMALOG) 100 UNIT/ML injection vial Inject into the skin 3 times daily (before meals)   Yes Historical Provider, MD   tiZANidine (ZANAFLEX) 4 MG tablet Take 4 mg by mouth every 6 hours as needed   Yes Historical Provider, MD   ondansetron (ZOFRAN-ODT) 4 MG disintegrating tablet Take 4 mg by mouth every 8 hours as needed for Nausea or Vomiting   Yes Historical Provider, MD   acetaminophen (TYLENOL) 325 MG tablet Take 650 mg by mouth every 6 hours as needed for Pain   Yes Historical Provider, MD   ibuprofen (ADVIL;MOTRIN) 200 MG tablet Take 200 mg by mouth every 6 hours as needed for Pain   Yes Historical Provider, MD       Current medications:    Current Facility-Administered Medications   Medication Dose Route Frequency Provider Last Rate Last Admin    0.9 % sodium chloride infusion   Intravenous Continuous Asia Ferrara  mL/hr at 21 0902 New Bag at 21 0902    sodium chloride flush 0.9 % injection 5-40 mL  5-40 mL Intravenous 2 times per day Asia Ferrara DO        sodium 07/30/2019     07/30/2019       CMP:   Lab Results   Component Value Date     07/30/2019    K 4.1 07/30/2019     07/30/2019    CO2 27 07/30/2019    BUN 12 07/30/2019    CREATININE 0.74 07/30/2019    GFRAA >60 07/30/2019    LABGLOM >60 07/30/2019    GLUCOSE 162 07/30/2019    PROT 7.6 07/30/2019    CALCIUM 9.4 07/30/2019    BILITOT 1.15 07/30/2019    ALKPHOS 83 07/30/2019    AST 19 07/30/2019    ALT 18 07/30/2019       POC Tests:   Recent Labs     08/11/21  0852   POCGLU 176*       Coags: No results found for: PROTIME, INR, APTT    HCG (If Applicable): No results found for: PREGTESTUR, PREGSERUM, HCG, HCGQUANT     ABGs: No results found for: PHART, PO2ART, JXT6PJL, EOK6WCL, BEART, E0HNZXCU     Type & Screen (If Applicable):  No results found for: LABABO, LABRH    Drug/Infectious Status (If Applicable):  No results found for: HIV, HEPCAB    COVID-19 Screening (If Applicable): No results found for: COVID19        Anesthesia Evaluation   no history of anesthetic complications:   Airway: Mallampati: II  TM distance: >3 FB   Neck ROM: full  Mouth opening: > = 3 FB Dental:          Pulmonary:normal exam              Patient did not smoke on day of surgery. Cardiovascular:  Exercise tolerance: good (>4 METS),                     Neuro/Psych:   (+) seizures:,             GI/Hepatic/Renal:             Endo/Other:    (+) DiabetesType I DM, using insulin, . Pt had no PAT visit       Abdominal:   (+) obese,           Vascular: negative vascular ROS. Other Findings:             Anesthesia Plan      general     ASA 3       Induction: intravenous. MIPS: Postoperative opioids intended and Prophylactic antiemetics administered. Anesthetic plan and risks discussed with patient. Plan discussed with CRNA.                   Scarlett Mariee MD   8/11/2021

## 2021-08-11 NOTE — H&P
Audie Hill. [facility], [address] [place] [address]  [phone]  New Patient Evaluation in Office    Pt Name: Josephine Carver  Date of Birth 1985   Today's Date: 8/3/2021  Medical Record Number: [id number]  Referring Provider: MARY Rivas *  Primary Care Provider: MARY Santoro - NP  Chief Complaint   Patient presents with   [facility] Surgical Consult     new patient--ref by GEN Grubbs for umbilical hernia     ASSESSMENT       Diagnosis Orders   1. Incisional hernia, without obstruction or gangrene  LAPAROSCOPY REPAIR HERNIA UMBILICAL / VENTRAL / INCISIONAL   2. Pre-op testing       Past Medical History:   Diagnosis Date    Seizures ([facility] [address]     Type 2 diabetes mellitus ([facility] [address]           PLANS      1. Schedule Angie Hurtado for L/S Incisional hernia repair  2. The risks, options and alternatives were discussed in the office. Bleeding, infection, reoperation and recurrence were discussed. Injury to other intra abdominal structures/organs and possible conversion to an open procedure as well as the risks of mesh infection were covered. The patient was given the opportunity to ask questions. Once answered, the patient was agreeable to proceed with the surgery. 3. Status: outpatient  4. Planned anesthesia: general  5. He will undergo pre-operative clearance per anesthesia guidelines with risk factors listed under the past medical history diagnosis & problem list.     Betty Daley is a 31 y.o.male who presents with abdominal pain. The pain is described as dull, and is moderate in intensity. Pain is located in the epigastric with radiation to the Left upper quadrant. He says that the pain becomes sharp with lifting and movement to an 8/10 pain. Onset was 2 months ago. Symptoms have been gradually worsening since. Aggravating factors include activity. Alleviating factors include none. Associated symptoms include nausea and heartburn. He admits to history of fevers, umbilical discharge, past hiatal hernia surgery and past tumor removal surgeries. Previous studies include CT scan which was essentially unremarkable. Past Medical History  Past Medical History:   Diagnosis Date    Seizures ([facility] [address]     Type 2 diabetes mellitus ([facility] [address]      Past Surgical History  Past Surgical History:   Procedure Laterality Date    FOOT SURGERY Right     benign mass    GASTRIC FUNDOPLICATION  94/21/1961    Dr. Hussain Oliva (in care everywhere)    OTHER SURGICAL HISTORY      left arm mass removed-- Dr. Hussain Oliva     Medications  Current Outpatient Medications   Medication Sig Dispense Refill    insulin glargine (LANTUS) 100 UNIT/ML injection vial Inject into the skin nightly 42 in morning 32 at night      insulin lispro (HUMALOG) 100 UNIT/ML injection vial Inject into the skin 3 times daily (before meals)      tiZANidine (ZANAFLEX) 4 MG tablet Take 4 mg by mouth every 6 hours as needed      ondansetron (ZOFRAN-ODT) 4 MG disintegrating tablet Take 4 mg by mouth every 8 hours as needed for Nausea or Vomiting      acetaminophen (TYLENOL) 325 MG tablet Take 650 mg by mouth every 6 hours as needed for Pain      ibuprofen (ADVIL;MOTRIN) 200 MG tablet Take 200 mg by mouth every 6 hours as needed for Pain       No current facility-administered medications for this visit. Allergies  is allergic to dust mite extract, mixed grasses, and molds & smuts. Family History  family history is not on file. Social History   reports that he has never smoked. He has never used smokeless tobacco. He reports that he does not drink alcohol and does not use drugs.   Health Screening Exams  Health Maintenance   Topic Date Due    Hepatitis C screen  Never done    Varicella vaccine (1 of 2 - 2-dose childhood series) Never done    COVID-19 Vaccine (1) Never done    HIV screen  Never done    DTaP/Tdap/Td vaccine (1 - Tdap) Never done    Flu vaccine (1) 09/01/2021    Hepatitis A vaccine  Aged Out  Hepatitis B vaccine  Aged Out    Hib vaccine  Aged Out    Meningococcal (ACWY) vaccine  Aged Out    Pneumococcal 0-64 years Vaccine  Aged Out     Review of Systems  Constitutional: Positive for fever (intermittent). Negative for activity change, appetite change, chills, diaphoresis, fatigue and unexpected weight change. HENT: Negative for congestion, dental problem, drooling, ear discharge, ear pain, facial swelling, hearing loss, mouth sores, nosebleeds, postnasal drip, rhinorrhea, sinus pressure, sinus pain, sneezing, sore throat, tinnitus, trouble swallowing and voice change. Eyes: Negative for photophobia, pain, discharge, redness, itching and visual disturbance. Respiratory: Negative for apnea, cough, choking, chest tightness, shortness of breath, wheezing and stridor. Cardiovascular: Negative for chest pain, palpitations and leg swelling. Gastrointestinal: Positive for abdominal distention, abdominal pain (umbilical/ left lower) and nausea. Negative for anal bleeding, blood in stool, constipation, diarrhea, rectal pain and vomiting. Endocrine: Negative for cold intolerance, heat intolerance, polydipsia, polyphagia and polyuria. Genitourinary: Negative for decreased urine volume, difficulty urinating, discharge, dysuria, enuresis, flank pain, frequency, genital sores, hematuria, penile pain, penile swelling, scrotal swelling, testicular pain and urgency. Musculoskeletal: Positive for back pain (spinal fractures per pt). Negative for arthralgias, gait problem, joint swelling, myalgias, neck pain and neck stiffness. Skin: Negative for color change, pallor, rash and wound. Allergic/Immunologic: Negative for environmental allergies, food allergies and immunocompromised state. Neurological: Positive for seizures (stress induced). Negative for dizziness, tremors, syncope, facial asymmetry, speech difficulty, weakness, light-headedness, numbness and headaches.    Hematological: Negative for adenopathy. Does not bruise/bleed easily. Psychiatric/Behavioral: Negative for agitation, behavioral problems, confusion, decreased concentration, dysphoric mood, hallucinations, self-injury, sleep disturbance and suicidal ideas. The patient is not nervous/anxious and is not hyperactive. OBJECTIVE    VITALS:  height is 6' 2\" (1.88 m) and weight is 249 lb (112.9 kg). His tympanic temperature is 97.2 °F (36.2 °C). His blood pressure is 122/90 (abnormal) and his pulse is 77. His respiration is 15 and oxygen saturation is 100%. Pain Score:   8 Body mass index is 31.97 kg/m². CONSTITUTIONAL: Alert and oriented times 3, no acute distress and cooperative to examination with proper mood and affect. SKIN: Skin color, texture, turgor normal. No rashes or lesions. LYMPH: no cervical nodes, no inguinal nodes  HEENT: Head is normocephalic, atraumatic. EOMI, PERRLA. NECK: Supple, symmetrical, trachea midline, no adenopathy, thyroid symmetric, not enlarged and no tenderness, skin normal.  CHEST/LUNGS: chest symmetric with normal A/P diameter, normal respiratory rate and rhythm, lungs clear to auscultation without wheezes, rales or rhonchi. No accessory muscle use. Scars None   CARDIOVASCULAR: Heart sounds are normal.  Regular rate and rhythm without murmur, gallop or rub. Normal S1 and S2. Carotid and femoral pulses 2+/4 and equal bilaterally. ABDOMEN: Normal shape. Laparoscopic scar(s) present. Normal bowel sounds. No bruits. soft, nondistended, no masses or organomegaly. incisional hernia is present at the supraumbilical port site which is reducible. Percussion: Normal without hepatosplenomegally. RECTAL: deferred, not clinically indicated  NEUROLOGIC: There are no focalizing motor or sensory deficits. CN II-XII are grossly intact. Heddy  EXTREMITIES: no cyanosis, no clubbing and no edema. Thank you for the interesting evaluation. Further recommendations as listed above.        Electronically signed by

## 2021-08-11 NOTE — OP NOTE
placed in the left lateral abdominal wall under direct visualization with no injury to underlying viscera. Attention was then taken to the anterior abdominal wall. The omentum along the anterior abdominal wall was lysed using sharp dissection and brief bursts of electrocautery. The herniated contents were able to be reduced from the hernial defects allowing adequate visualization. Total measurement of the defects was 1.5 cm, by 1.5 cm. A medium ventralex mesh was inserted through the 10 mm port site. Four stab incisions were made in each of the quadrants through which a Julio needle on 2-0 Prolene suture was inserted through the abdominal wall under direct visualization and subsequently placed through the mesh and removed out through the anterior abdominal wall using Endo close device. After 4 corners had been tacked these stitches were tied and the circumferential portion of the mesh was secured to the anterior abdominal wall using US surgical protracting device under direct visualization. Good approximation of mesh in the anterior abdominal wall was appreciated and no evidence of viscera anterior to the mesh was visualized. No additional pathology identified. All ports and instruments removed from the patient's abdomen, pneumoperitoneum reduced. Skin incisions were infiltrated with local anesthetic and skin closed using 4-0 Vicryl suture in combination with single interrupted and running subcuticular fashion. The wounds were then cleansed, prepared with Mastisol, Steri-Strips and sterile dressings were applied. The patient brought out of anesthesia, transferred to PACU in stable and satisfactory condition. No immediate complications evident. All sponge, instrument and needle counts were correct at the completion of the procedure. Postoperative findings discussed with the patient's family.  He was given discharge instructions, prescriptions for analgesics and will follow up in my office in 2 week period of time for reevaluation.        Electronically signed by Nataly Watts DO on 8/11/2021 at 10:19 AM

## 2021-08-11 NOTE — ANESTHESIA POSTPROCEDURE EVALUATION
Department of Anesthesiology  Postprocedure Note    Patient: Rohini Trejo  MRN: [id number]  YOB: 1985  Date of evaluation: 8/11/2021  Time:  3:51 PM     Procedure Summary     Date: 08/11/21 Room / Location: [place] [facility] / [place] [facility]    Anesthesia Start: 0939 Anesthesia Stop: 1024    Procedure: LAPAROSCOPIC INCISIONAL HERNIA REPAIR WITH MESH, POSS OPEN (N/A ) Diagnosis: (INCISIONAL HERNIA)    Surgeons: Altagracia Perea DO Responsible Provider: Claudette Edelman, MD    Anesthesia Type: general ASA Status: 3          Anesthesia Type: general    Mary Phase I: Mary Score: 10    Mary Phase II: Mary Score: 10    Last vitals: Reviewed and per EMR flowsheets.        Anesthesia Post Evaluation    Patient location during evaluation: PACU  Patient participation: complete - patient participated  Level of consciousness: awake and alert  Airway patency: patent  Nausea & Vomiting: no nausea  Complications: no  Cardiovascular status: blood pressure returned to baseline and hemodynamically stable  Respiratory status: acceptable and spontaneous ventilation  Hydration status: euvolemic

## 2021-08-27 ENCOUNTER — OFFICE VISIT (OUTPATIENT)
Dept: SURGERY | Age: 36
End: 2021-08-27

## 2021-08-27 VITALS
RESPIRATION RATE: 18 BRPM | BODY MASS INDEX: 32.02 KG/M2 | HEART RATE: 85 BPM | DIASTOLIC BLOOD PRESSURE: 62 MMHG | TEMPERATURE: 96.8 F | SYSTOLIC BLOOD PRESSURE: 118 MMHG | WEIGHT: 249.5 LBS | OXYGEN SATURATION: 96 % | HEIGHT: 74 IN

## 2021-08-27 DIAGNOSIS — K43.2 INCISIONAL HERNIA, WITHOUT OBSTRUCTION OR GANGRENE: Primary | ICD-10-CM

## 2021-08-27 PROCEDURE — 99024 POSTOP FOLLOW-UP VISIT: CPT | Performed by: SURGERY

## 2021-08-27 NOTE — PROGRESS NOTES
Gopal Hunt. [facility], [address] [place] [address]  [phone]  Post Procedure Evaluation in Office    Pt Name: Jaspreet Cooper  Date of Birth 1985   Today's Date: 8/27/2021  Medical Record Number: [id number]  Referring Provider: No ref. provider found  Primary Care Provider: MARY Wilson NP  Chief Complaint   Patient presents with   [facility] Post-Op Check     s/p Laparoscopic incisional hernia repair with Ventralex mesh-8/11/2021     ASSESSMENT       Diagnosis Orders   1. Incisional hernia, without obstruction or gangrene      S/p L/S incisional hernia repair with mesh 8/11/21   Incisions are clean, dry and intact or healing as expected   PLANS      Pathology reviewed with the patient who understands. All questions were answered. Patient Instructions   No lifting, pushing or pulling more than 30 lbs for 2 weeks, then 50 lbs for 2 weeks, then 80 lbs for 2 weeks. No restrictions after 6 weeks. Follow up: Return for As needed. Instructed to call if any concerns. Hawk Naik is seen today for post-op follow-up. He is S/p L/S incisional hernia repair with mesh 8/11/21. He is tolerating a regular diet, having regular bowel movements. Symptoms and activity have gradually improved compared to preoperative. The surgical site is clean and has no drainage. Pain is controlled without any narcotic pain medications. He has compliant with postoperative instructions. Past Medical History   has a past medical history of Seizures ([address] and Type 2 diabetes mellitus ([facility] [address] Past Surgical History   has a past surgical history that includes Gastric fundoplication (90/68/5617); other surgical history; Foot surgery (Right); and hernia repair (N/A, 8/11/2021).   Medications  has a current medication list which includes the following prescription(s): cyclobenzaprine hcl, metoprolol succinate, omeprazole, insulin glargine, insulin lispro, tizanidine, ondansetron, acetaminophen, and ibuprofen. Allergies  is allergic to dust mite extract, mixed grasses, and molds & smuts. Social History   reports that he has never smoked. He has never used smokeless tobacco. He reports that he does not drink alcohol and does not use drugs. Health Screening Exams  Health Maintenance   Topic Date Due    Hepatitis C screen  Never done    Varicella vaccine (1 of 2 - 2-dose childhood series) Never done    COVID-19 Vaccine (1) Never done    HIV screen  Never done    DTaP/Tdap/Td vaccine (1 - Tdap) Never done    Flu vaccine (1) 09/01/2021    Hepatitis A vaccine  Aged Out    Hepatitis B vaccine  Aged Out    Hib vaccine  Aged Out    Meningococcal (ACWY) vaccine  Aged Out    Pneumococcal 0-64 years Vaccine  Aged Out     Review of Systems  History obtained from the patient. Constitutional: Denies any fever, chills, fatigue. Wound: Denies any rash, skin color changes or wound problems. Resp: Denies any cough, shortness of breath. CV: Denies any chest pain, orthopnea or syncope. GI: Denies any nausea, vomiting, blood in the stool, constipation or diarrhea. Positive for incisional discomfort only. : Denies any hematuria, hesitancy or dysuria. OBJECTIVE    VITALS:  height is 6' 2\" (1.88 m) and weight is 249 lb 8 oz (113.2 kg). His temporal temperature is 96.8 °F (36 °C). His blood pressure is 118/62 and his pulse is 85. His respiration is 18 and oxygen saturation is 96%. Pain Score:   9  CONSTITUTIONAL: Alert and oriented times 3, no acute distress and cooperative to examination. SKIN: Skin color, texture, turgor normal. No rashes or lesions. INCISION: wound margins intact and healing well. No signs of infection. No drainage. LUNGS: Chest expands equally bilaterally upon respiration, no accessory muscle used. Ausculation reveals no wheezes, rales or rhonchi. CARDIOVASCULAR: Heart sounds are normal.  Regular rate and rhythm without murmur, gallop or rub.  Normal S1 and S2.  ABDOMEN: Soft, nondistended, no masses or organomegaly. Bowel sounds normal. Laparoscopic scars present. No sign of recurrence, hematoma or seroma. Tenderness to palpation incisional only. NEUROLOGIC: No sensory or motor nerve irritation       Thank you for the interesting evaluation. Further recommendations as listed above.        Electronically signed by Nataly Watts DO on 8/27/2021 at 1:05 PM

## 2021-08-27 NOTE — LETTER
Nataly Watts DO  [address] [place] [id number] [address]  [phone]     Pt Name: Megan Albarran  Medical Record Number: [id number]  Date of Birth 1985   Today's Date: 8/27/2021    Jennifer Reyes was evaluated in the office today. My assessment and plans are listed below. ASSESSMENT      Diagnosis Orders   1. Incisional hernia, without obstruction or gangrene      S/p L/S incisional hernia repair with mesh 8/11/21   Incisions are clean, dry and intact or healing as expected  PLANS:     Pathology reviewed with the patient who understands. All questions were answered. Patient Instructions   No lifting, pushing or pulling more than 30 lbs for 2 weeks, then 50 lbs for 2 weeks, then 80 lbs for 2 weeks. No restrictions after 6 weeks. Follow up: Return for As needed. Instructed to call if any concerns. If I can provide any additional assistance or you have any concerns, please feel free to contact me. Thank you for allowing to participate in the care of your patients. Sincerely,      Mehnaz Fragoso.  Adelaida Ferrara

## 2021-09-15 ENCOUNTER — INITIAL CONSULT (OUTPATIENT)
Dept: NEUROLOGY | Age: 36
End: 2021-09-15
Payer: COMMERCIAL

## 2021-09-15 VITALS
DIASTOLIC BLOOD PRESSURE: 88 MMHG | BODY MASS INDEX: 32.08 KG/M2 | WEIGHT: 250 LBS | HEIGHT: 74 IN | SYSTOLIC BLOOD PRESSURE: 124 MMHG | HEART RATE: 85 BPM | OXYGEN SATURATION: 98 %

## 2021-09-15 DIAGNOSIS — R56.9 NOCTURNAL SEIZURES (HCC): Primary | ICD-10-CM

## 2021-09-15 PROCEDURE — G8417 CALC BMI ABV UP PARAM F/U: HCPCS | Performed by: PSYCHIATRY & NEUROLOGY

## 2021-09-15 PROCEDURE — 1036F TOBACCO NON-USER: CPT | Performed by: PSYCHIATRY & NEUROLOGY

## 2021-09-15 PROCEDURE — 99204 OFFICE O/P NEW MOD 45 MIN: CPT | Performed by: PSYCHIATRY & NEUROLOGY

## 2021-09-15 PROCEDURE — G8427 DOCREV CUR MEDS BY ELIG CLIN: HCPCS | Performed by: PSYCHIATRY & NEUROLOGY

## 2021-09-15 RX ORDER — MONTELUKAST SODIUM 10 MG/1
10 TABLET ORAL DAILY
COMMUNITY

## 2021-09-15 NOTE — PATIENT INSTRUCTIONS
1. Obtain records from previous neurologist at [facility]  2. EEG  3. Sleep evaluation  4. Referral to epilepsy specialist at [place]  5. Call with any new symptoms or concerns.    6. Follow up after evaluation with epilepsy specialist as [facility]

## 2021-09-20 NOTE — PROGRESS NOTES
Chief Complaint   Patient presents with    Consultation     seizure       Kirstin Rodriguez is a 31 y.o. male who presents today for evaluation of history of seizure since 2010. Typical seizure always occurs during sleep. He has been seen by neurology in the past and is establishing local care. He has been told in the past that these events were nonepileptic. His most recent event was 2 months ago. He did bite his tongue, but was not incontinent of bowel or bladder. He is not currently on any antiepileptic medications as he took himself off of them. He has been on keppra and Lamictal in the past. These medications worked for the seizure, however he did not tolerate the medications due to side effects. His sleep is poor and interrupted, he wakes up feeling tired from sleep. He has not been told he snores. He does take daytime naps. He has never had a sleep study done. MRI brain done 4/30/2014 showed no acute findings. No family history of seizure. No history of head injury. Denies any alcohol or drug abuse. He denies chest pain. No shortness of breath, no neck pain. No vision changes. No dysphagia. No fever. No rash. No weight loss. History provided by patient. Past Medical History:   Diagnosis Date    Hypertension     [facility] [address]     Type 2 diabetes mellitus ([facility] [address]        There is no problem list on file for this patient.       Allergies   Allergen Reactions    Dust Mite Extract     Gabapentin Hives    Mixed Grasses     Molds & Smuts        Current Outpatient Medications   Medication Sig Dispense Refill    Cyclobenzaprine HCl (FLEXERIL PO) Take by mouth      metoprolol succinate (TOPROL XL) 25 MG extended release tablet Take 25 mg by mouth daily      omeprazole (PRILOSEC) 20 MG delayed release capsule Take 40 mg by mouth daily      insulin glargine (LANTUS) 100 UNIT/ML injection vial Inject into the skin nightly 42 in morning 32 at night      insulin lispro (HUMALOG) 100 UNIT/ML injection vial Inject into the skin 3 times daily (before meals)      tiZANidine (ZANAFLEX) 4 MG tablet Take 4 mg by mouth every 6 hours as needed      ondansetron (ZOFRAN-ODT) 4 MG disintegrating tablet Take 4 mg by mouth every 8 hours as needed for Nausea or Vomiting      acetaminophen (TYLENOL) 325 MG tablet Take 650 mg by mouth every 6 hours as needed for Pain      ibuprofen (ADVIL;MOTRIN) 200 MG tablet Take 200 mg by mouth every 6 hours as needed for Pain      montelukast (SINGULAIR) 10 MG tablet Take 10 mg by mouth daily       No current facility-administered medications for this visit. Social History     Socioeconomic History    Marital status: Single     Spouse name: None    Number of children: None    Years of education: None    Highest education level: None   Occupational History    None   Tobacco Use    Smoking status: Never Smoker    Smokeless tobacco: Never Used   Vaping Use    Vaping Use: Never used   Substance and Sexual Activity    Alcohol use: Never    Drug use: Never    Sexual activity: Yes     Partners: Female   Other Topics Concern    None   Social History Narrative    None     Social Determinants of Health     Financial Resource Strain:     Difficulty of Paying Living Expenses:    Food Insecurity:     Worried About Running Out of Food in the Last Year:     Ran Out of Food in the Last Year:    Transportation Needs:     Lack of Transportation (Medical):      Lack of Transportation (Non-Medical):    Physical Activity:     Days of Exercise per Week:     Minutes of Exercise per Session:    Stress:     Feeling of Stress :    Social Connections:     Frequency of Communication with Friends and Family:     Frequency of Social Gatherings with Friends and Family:     Attends Caodaism Services:     Active Member of Clubs or Organizations:     Attends Club or Organization Meetings:     Marital Status:    Intimate Partner Violence:     Fear of Current or Ex-Partner:     Emotionally Abused: Abnormal movement none, vibration normal, proprioception normal  Skin - warm, dry to touch, normal coloration, no rashes, no suspicious skin lesions  Superficial temporal artery pulses are normal.   There is no limitation of range of motion of the neck. There is no resting tremor, no pin rolling, no bradykinesia, no Hypohonia, normal blink rate. Musculoskeletal: Has no hand arthritis, no limitation of ROM in any of the four extremities. There is no leg edema. The Heart was regular in rate and rhythm. No heart murmur  Chest Clear, with  good effort. Abdomen soft, intact bowel sounds. Results for orders placed during the hospital encounter of 04/30/14     [address]          Patient Name: Vincent Arteaga  Patient Type: Sri Black           MRN:  [id number]  Gender:  Male                     Account Number:  [de-identified]  [facility] Phys:  Lety Loss YOB: 1985  M. D. Pt Loc:  Outpatient      R a d i o l o g y   R e p o r t    Accession Number:  Procedure Name:             Exam Date/Time:  [id number]      MRI Brain B Stem WO Contr   4/30/2014 10:24:44 AM    CPT4 code  35294      Reason For Exam  345.9, without mention of intractable seizures;345.9, without mention of  intractable seizures      REPORT  MRI BRAIN WITHOUT CONTRAST:    CLINICAL INFORMATION:  Has had blacking out spells for the past month, also  has a history of seizures, last seizure 10/13. TECHNIQUE:  Multiplanar multi spin echo MR images of the brain were  obtained. COMPARISON:  None available. FINDINGS:  The diffusion weighted images and the apparent diffusion  coefficient maps appear unremarkable without areas of restricted diffusion  seen. There is mild bilateral paranasal sinus mucosal thickening seen. There is no evidence of acute intracranial hemorrhage, mass effect, midline  shift or obstructive hydrocephalus.     There are no abnormal extra-axial fluid collections. The brain volume is within normal limits for the patient's age. Thin section coronal 3D FLAIR as well as T2 weighted images were obtained. The hippocampi are symmetric in volume and are of normal T2 signal  intensity without evidence of mesial temporal sclerosis. The cerebral cortex is intact. The craniocervical junction appears unremarkable without evidence of      Page 1 of 2              Print date/time:5/2/2014 3:34 PM        [id number] [facility]          Patient Name: [facility]  Patient Type: Terrance Black           MRN:  [id number]  Gender:  Male                     Account Number:  [de-identified]  [facility] Phys:  Jessica Olmos YOB: 1985  M. DAjay Pt Loc:  Outpatient      R a d i o l o g y   R e p o r t    Accession Number:  Procedure Name:             Exam Date/Time:  [id number]      MRI Brain B Stem WO Contr   4/30/2014 10:24:44 AM    cerebellar tonsillar ectopia. The sellar contents appear unremarkable. Impression  Unremarkable MR appearance of the brain. *FINAL REPORT*  Dictated By :  Carmen Long M.D. Certified Electronic Signature  [facility].  Dictated Date and Time:  30-APR-2014 11:52  Transcribed By:  [facility]  Transcribed Date and Time:  01-MAY-2014 21:18  Approved By:  Carmen Long M.D. Approve Date and Time:  02-MAY-2014 15:33    Technologist:  Vicente STYLES(CELIA)(MR)      Page 2 of 2              Print date/time:5/2/2014 3:34 PM        We reviewed the patient records from referring provider and available information in the EHR       ASSESSMENT:      Diagnosis Orders   1. Nocturnal seizures ([address]        This is a 68-year-old male who presents with history of seizure since 2010. Typical seizure always occurred during sleep. He has been seen by neurology in the past and is establishing local care. He shared with us he was told these events were \"nonepileptic\". His most recent event was 2 months ago. He is currently not on any antiepileptic medications as he took himself off of them due to side effects. Has been on Keppra and Lamictal in the past, and did not tolerate them. He did undergo an MRI brain and 4/30/2014 that showed no acute findings. It is difficult to determine the nature of the patient symptoms given the limited amount of information provided/available. We will attempt to obtain records from his previous neurologist at [facility] for further review. We will arrange for him to undergo an EEG to screen for seizure tendency as well as evaluation with sleep medicine to screen for underlying sleep disorder. He would benefit from formal evaluation with epilepsy specialist at [facility] regarding these events. After detailed discussion with the patient we agreed on the following plan. Plan    1. Obtain records from previous neurologist at [facility]  2. EEG  3. Sleep evaluation evaluate for underlying sleep disorder. 4. Referral to epilepsy specialist at [facility]  5. Call with any new symptoms or concerns.    6. Follow up after evaluation with epilepsy specialist as [facility]      Total time spent 58  minutes        Ian Flower MD

## 2021-09-22 ENCOUNTER — OFFICE VISIT (OUTPATIENT)
Dept: NEPHROLOGY | Age: 36
End: 2021-09-22
Payer: COMMERCIAL

## 2021-09-22 VITALS
BODY MASS INDEX: 31.46 KG/M2 | DIASTOLIC BLOOD PRESSURE: 82 MMHG | HEART RATE: 82 BPM | WEIGHT: 245 LBS | SYSTOLIC BLOOD PRESSURE: 118 MMHG | OXYGEN SATURATION: 98 %

## 2021-09-22 DIAGNOSIS — I10 HTN (HYPERTENSION), BENIGN: Primary | ICD-10-CM

## 2021-09-22 DIAGNOSIS — N17.0 ARF (ACUTE RENAL FAILURE) WITH TUBULAR NECROSIS (HCC): ICD-10-CM

## 2021-09-22 DIAGNOSIS — E11.21 DIABETIC NEPHROPATHY WITH PROTEINURIA (HCC): ICD-10-CM

## 2021-09-22 PROCEDURE — 2022F DILAT RTA XM EVC RTNOPTHY: CPT | Performed by: INTERNAL MEDICINE

## 2021-09-22 PROCEDURE — 3046F HEMOGLOBIN A1C LEVEL >9.0%: CPT | Performed by: INTERNAL MEDICINE

## 2021-09-22 PROCEDURE — 99204 OFFICE O/P NEW MOD 45 MIN: CPT | Performed by: INTERNAL MEDICINE

## 2021-09-22 PROCEDURE — 1036F TOBACCO NON-USER: CPT | Performed by: INTERNAL MEDICINE

## 2021-09-22 PROCEDURE — G8417 CALC BMI ABV UP PARAM F/U: HCPCS | Performed by: INTERNAL MEDICINE

## 2021-09-22 PROCEDURE — G8428 CUR MEDS NOT DOCUMENT: HCPCS | Performed by: INTERNAL MEDICINE

## 2021-09-22 ASSESSMENT — ENCOUNTER SYMPTOMS
DIARRHEA: 0
COUGH: 0
VOMITING: 0
SHORTNESS OF BREATH: 0
BACK PAIN: 1
ABDOMINAL PAIN: 1
NAUSEA: 1
EYES NEGATIVE: 1

## 2021-09-22 NOTE — PROGRESS NOTES
Seizure in July  CT Chest Pelvis Abdomen  8/21 Dr Estuardo Payne repair of ABD surgery    HX of hypertension diabetes and seizures

## 2021-09-22 NOTE — PROGRESS NOTES
[facility]         Outpatient Initial consult note         9/22/2021 11:53 AM    [address] [address]  Dept: [phone]  Loc: [phone]      Patient Name:   Jazmyn Rodriguez  YOB: 1985  Primary Care Physician:  MARY Jain NP     Chief Complaint : Evaluation of   worsening renal function     History of presenting illness :Jazmyn Rodriguez is a 31 y.o.   male with Past Medical History as stated below was sent / referred by MARY Jain NP forevaluation of the above problem. Patient has a history of hypertension for 1 years. Patient has history of diabetes mellitus, without retinopathy &nephropathy and for 20 years. The patient denies history of renal stones anddenies NSAID use. Patient has a history of proteinuria. Patient Never had a kidney biopsy done. Patient does not use Herbal remedies/vitamin supplements. Patient denies frequency, hematuria, hesitancy. Patient has no family history of kidney disease. Patient's chronic medical conditions of diabetes, hypertension and possible GERD are overall reasonably stable and well controlled. Last A1c is around 7.0    Patient has a history of seizure disorder and recently had a seizure in July, severe to the point that he has fractured ribs and he was found to have elevated serum creatinine and so he was referred to us for further evaluation and management.      Past History :     Past Medical History:   Diagnosis Date    Hypertension     Seizures ([facility] [address]     Type 2 diabetes mellitus ([facility] [address]      Past Surgical History:   Procedure Laterality Date    FOOT SURGERY Right     benign mass    GASTRIC FUNDOPLICATION  71/02/5937    Dr. Hussain Oliva (in care everywhere)   [address] N/A 8/11/2021    LAPAROSCOPIC INCISIONAL HERNIA REPAIR WITH MESH, POSS OPEN performed by Rosie Seo DO at [address] [place] [address] OTHER SURGICAL HISTORY      left arm mass removed-- Dr. Gus Trotter     Social History     Socioeconomic History    Marital status: Single     Spouse name: Not on file    Number of children: Not on file    Years of education: Not on file    Highest education level: Not on file   Occupational History    Not on file   Tobacco Use    Smoking status: Never Smoker    Smokeless tobacco: Never Used   Vaping Use    Vaping Use: Never used   Substance and Sexual Activity    Alcohol use: Never    Drug use: Never    Sexual activity: Yes     Partners: Female   Other Topics Concern    Not on file   Social History Narrative    Not on file     Social Determinants of Health     Financial Resource Strain:     Difficulty of Paying Living Expenses:    Food Insecurity:     Worried About Running Out of Food in the Last Year:     [address] in the Last Year:    Transportation Needs:     Lack of Transportation (Medical):  Lack of Transportation (Non-Medical):    Physical Activity:     Days of Exercise per Week:     Minutes of Exercise per Session:    Stress:     Feeling of Stress :    Social Connections:     Frequency of Communication with Friends and Family:     Frequency of Social Gatherings with Friends and Family:     Attends Sikhism Services:     Active Member of Clubs or Organizations:     Attends Club or Organization Meetings:     Marital Status:    Intimate Partner Violence:     Fear of Current or Ex-Partner:     Emotionally Abused:     Physically Abused:     Sexually Abused:      Family History   Problem Relation Age of Onset    Diabetes Mother     No Known Problems Father     Irritable Bowel Syndrome Sister     Migraines Sister      Medications & Allergies :      Prior to Admission medications    Medication Sig Start Date End Date Taking?  Authorizing Provider   montelukast (SINGULAIR) 10 MG tablet Take 10 mg by mouth daily   Yes Historical Provider, MD   Cyclobenzaprine HCl (FLEXERIL PO) Take by mouth   Yes Historical Provider, MD   metoprolol succinate (TOPROL XL) 25 MG extended release tablet Take 25 mg by mouth daily   Yes Historical Provider, MD   omeprazole (PRILOSEC) 20 MG delayed release capsule Take 40 mg by mouth daily   Yes Historical Provider, MD   insulin glargine (LANTUS) 100 UNIT/ML injection vial Inject into the skin nightly 42 in morning 32 at night   Yes Historical Provider, MD   insulin lispro (HUMALOG) 100 UNIT/ML injection vial Inject into the skin 3 times daily (before meals)   Yes Historical Provider, MD   tiZANidine (ZANAFLEX) 4 MG tablet Take 4 mg by mouth every 6 hours as needed   Yes Historical Provider, MD   ondansetron (ZOFRAN-ODT) 4 MG disintegrating tablet Take 4 mg by mouth every 8 hours as needed for Nausea or Vomiting   Yes Historical Provider, MD   acetaminophen (TYLENOL) 325 MG tablet Take 650 mg by mouth every 6 hours as needed for Pain   Yes Historical Provider, MD   ibuprofen (ADVIL;MOTRIN) 200 MG tablet Take 200 mg by mouth every 6 hours as needed for Pain   Yes Historical Provider, MD     Allergies: Dust mite extract, Gabapentin, Mixed grasses, and Molds & smuts    Review of Systems Physical Exam   Review of Systems   Constitutional: Negative for chills, fatigue and fever. HENT: Negative. Eyes: Negative. Respiratory: Negative for cough and shortness of breath. Cardiovascular: Negative for chest pain and leg swelling. Gastrointestinal: Positive for abdominal pain and nausea. Negative for diarrhea and vomiting. Genitourinary: Negative for dysuria, frequency and hematuria. Musculoskeletal: Positive for back pain. Negative for neck pain. Skin: Negative for rash and wound. Neurological: Positive for dizziness, seizures and light-headedness. Negative for headaches. Psychiatric/Behavioral: Negative for agitation and confusion. Physical Exam  Vitals reviewed. Constitutional:       General: He is not in acute distress.      Appearance: Normal appearance. He is not diaphoretic. HENT:      Head: Normocephalic and atraumatic. Right Ear: External ear normal.      Left Ear: External ear normal.      Nose: Nose normal.      Mouth/Throat:      Mouth: Mucous membranes are moist.   Eyes:      General: No scleral icterus. Right eye: No discharge. Left eye: No discharge. Conjunctiva/sclera: Conjunctivae normal.   Neck:      Thyroid: No thyromegaly. Vascular: No JVD. Cardiovascular:      Rate and Rhythm: Normal rate and regular rhythm. Heart sounds: Normal heart sounds. No murmur heard. Pulmonary:      Effort: Pulmonary effort is normal. No respiratory distress. Breath sounds: Normal breath sounds. No stridor. Chest:      Chest wall: No tenderness. Abdominal:      General: Bowel sounds are normal. There is no distension. Palpations: Abdomen is soft. Tenderness: There is no abdominal tenderness. Musculoskeletal:         General: No swelling or tenderness. Cervical back: Normal range of motion and neck supple. Right lower leg: No edema. Left lower leg: No edema. Skin:     General: Skin is warm and dry. Findings: No erythema or rash. Neurological:      General: No focal deficit present. Mental Status: He is alert and oriented to person, place, and time.    Psychiatric:         Mood and Affect: Mood normal.         Behavior: Behavior normal.          Vitals   Vitals:    09/22/21 1130   BP: 118/82   Site: Left Upper Arm   Position: Sitting   Cuff Size: Medium Adult   Pulse: 82   SpO2: 98%   Weight: 245 lb (111.1 kg)        Labs, Radiology and Tests :    Labs -    7/21           POtassium 4.5           BUN 18           Creatinine 1.72           eGFR 51                       UPCR +           UMCR                          CT Scan -- 8/21  Kidneys are normal     Other : old  lab data have been reviewed and noted that patient's creatinine couple of months ago is around 0.96 and in earlier in July it was 1.26    Assessment    1. Renal -patient may be having chronic kidney disease stage II due to the presence of microalbuminuria. However  -His last worsening creatinine is most likely due to possible rhabdomyolysis from the seizure.  -I will check his labs now and see where the creatinine runs. No ratio is back to his baseline  -CT scan of the abdomen which was done last month shows no acute abnormalities. 2. Electrolytes appear to be within normal limits  3. Essential hypertension running well continue current medications  4. Hx of diabetes with possible diabetic nephropathy quantify protein in the next visit  5. Seizure disorder-we will be seeing [facility]  6. Meds reviewed discussed with the patient in detail  7. Follow-up in 4 weeks  Plan     Orders Placed This Encounter   Procedures    Basic Metabolic Panel    Comprehensive Metabolic Panel    Urinalysis with Microscopic    Microalbumin / Creatinine Urine Ratio    Protein / creatinine ratio, urine       Elvinyolanda Galeano M.D  [facility].

## 2021-10-20 ENCOUNTER — HOSPITAL ENCOUNTER (OUTPATIENT)
Dept: NEUROLOGY | Age: 36
Discharge: HOME | End: 2021-10-20
Payer: COMMERCIAL

## 2021-10-20 DIAGNOSIS — R56.9 NOCTURNAL SEIZURES (HCC): ICD-10-CM

## 2021-10-20 PROCEDURE — 95819 EEG AWAKE AND ASLEEP: CPT

## 2021-10-20 NOTE — PROGRESS NOTES
[facility] Laboratory Technician worksheet       EEG Date: 10/20/2021    Name: Megan Merlos   : 1985   Age: 31 y.o. SEX: male    Room: OP    MRN: [id number]     CSN: [id number]    Ordering Provider: Simona Tellez  EEG Number: [id number] Time of Test:  6649    Hand: Right   Sedation: No    H.V. Done: Yes with good effort Photic: Yes    Sleep: Yes   Drowsy: Yes   Sleep Deprived: No    Seizures observed: no    Mentality: alert       Clinical History: Nocturnal seizures. History of seizures since , last one occurred at the end of 2021 while sleeping and patient states that he cracked ribs and fractured vertebrae during event. He remembers going to sleep and then waking up in the ED. Past Medical History:       Diagnosis Date    Hypertension     Se[facility]ures ([facility] [address]     Type 2 diabetes mellitus ([facility] [address]          Prior to Admission medications    Medication Sig Start Date End Date Taking?  Authorizing Provider   montelukast (SINGULAIR) 10 MG tablet Take 10 mg by mouth daily    Historical Provider, MD   Cyclobenzaprine HCl (FLEXERIL PO) Take by mouth    Historical Provider, MD   metoprolol succinate (TOPROL XL) 25 MG extended release tablet Take 25 mg by mouth daily    Historical Provider, MD   omeprazole (PRILOSEC) 20 MG delayed release capsule Take 40 mg by mouth daily    Historical Provider, MD   insulin glargine (LANTUS) 100 UNIT/ML injection vial Inject into the skin nightly 42 in morning 32 at night    Historical Provider, MD   insulin lispro (HUMALOG) 100 UNIT/ML injection vial Inject into the skin 3 times daily (before meals)    Historical Provider, MD   tiZANidine (ZANAFLEX) 4 MG tablet Take 4 mg by mouth every 6 hours as needed    Historical Provider, MD   ondansetron (ZOFRAN-ODT) 4 MG disintegrating tablet Take 4 mg by mouth every 8 hours as needed for Nausea or Vomiting    Historical Provider, MD   acetaminophen (TYLENOL) 325 MG tablet Take 650 mg by mouth every 6 hours as needed for Pain    Historical Provider, MD   ibuprofen (ADVIL;MOTRIN) 200 MG tablet Take 200 mg by mouth every 6 hours as needed for Pain    Historical Provider, MD       Technician: Lupe Fierro 10/20/2021

## 2021-10-21 NOTE — PROCEDURES
[address] [place] [address]                          ELECTROENCEPHALOGRAM REPORT    PATIENT NAME: Annette King                       :        1985  MED REC NO:   [id number]                           ROOM:  ACCOUNT NO:   [de-identified]                           ADMIT DATE: 10/20/2021  PROVIDER:     Jose Guadalupe Schmid. Moncho Martinez MD    DATE OF EEG:  10/20/2021    REFERRING PROVIDER:  Tonia Ascencio CNP    CLINICAL HISTORY:  This is a 30-year-old male presenting with nocturnal  seizure, history of seizures since  and the last one occurred in  2021. MEDICATIONS LISTED:  Singulair, Flexeril, Toprol, Prilosec, Lantus,  Humalog, Zanaflex, Tylenol, and ibuprofen. This is a 17-channel EEG performed without sleep deprivation. Hyperventilation and photic stimulation were performed. The patient is  described as alert. The background rhythm activity was noted to be 9 Hz in the posterior  parietal area, symmetric, well modulated, attenuates with eye opening. Hyperventilation was performed for 3 minutes with fair effort without  abnormality. The patient was noted to be drowsy during parts of  recording. The patient was noted to be asleep during parts of  recording. Photic stimulation was performed with driving seen through  some of the frequencies. There was no evidence of epileptiform activity  appreciated. No clinical seizures were observed. IMPRESSION:  This is a normal awake and sleep EEG. There was no  evidence of epileptiform activity appreciated.         Selby Simmonds, MD    D: 10/21/2021 11:02:10       T: 10/21/2021 11:04:23     [email or web address]  Job#: [id number]     Doc#: [id number]    CC:

## 2021-10-25 PROCEDURE — 95819 EEG AWAKE AND ASLEEP: CPT | Performed by: PSYCHIATRY & NEUROLOGY

## 2021-10-27 ENCOUNTER — OFFICE VISIT (OUTPATIENT)
Dept: NEPHROLOGY | Age: 36
End: 2021-10-27
Payer: COMMERCIAL

## 2021-10-27 VITALS — HEART RATE: 83 BPM | DIASTOLIC BLOOD PRESSURE: 93 MMHG | SYSTOLIC BLOOD PRESSURE: 130 MMHG

## 2021-10-27 DIAGNOSIS — I10 HTN (HYPERTENSION), BENIGN: Primary | ICD-10-CM

## 2021-10-27 DIAGNOSIS — N17.0 ARF (ACUTE RENAL FAILURE) WITH TUBULAR NECROSIS (HCC): ICD-10-CM

## 2021-10-27 PROCEDURE — 1036F TOBACCO NON-USER: CPT | Performed by: INTERNAL MEDICINE

## 2021-10-27 PROCEDURE — G8484 FLU IMMUNIZE NO ADMIN: HCPCS | Performed by: INTERNAL MEDICINE

## 2021-10-27 PROCEDURE — 99213 OFFICE O/P EST LOW 20 MIN: CPT | Performed by: INTERNAL MEDICINE

## 2021-10-27 PROCEDURE — G8417 CALC BMI ABV UP PARAM F/U: HCPCS | Performed by: INTERNAL MEDICINE

## 2021-10-27 PROCEDURE — G8427 DOCREV CUR MEDS BY ELIG CLIN: HCPCS | Performed by: INTERNAL MEDICINE

## 2021-10-27 RX ORDER — TIZANIDINE 2 MG/1
TABLET ORAL
COMMUNITY
Start: 2021-10-14 | End: 2021-10-27

## 2021-10-27 NOTE — PROGRESS NOTES
[facility]        Outpatient Follow-Up note         10/27/2021 9:48 AM    Patient Name:   Donna Roman:    1985  Primary Care Physician:  MARY Gutiérrez - PAIGE   [address] [address]  Dept: [phone]  Loc: [phone]     Chief Complaint / Reason for follow-up : Follow Up of SARITA/CKD     Interval History :  Patient seen and examined by me.    Feels well no chest pain no shortness of breath  No hospitalizations or med changes     Past History :  Past Medical History:   Diagnosis Date    Hypertension     [facility]     Type 2 diabetes mellitus ([facility]      Past Surgical History:   Procedure Laterality Date    FOOT SURGERY Right     benign mass    GASTRIC FUNDOPLICATION  39/10/3365    Dr. Pillo Stark (in care everywhere)   Severiano Guthrie N/A 8/11/2021    LAPAROSCOPIC INCISIONAL HERNIA REPAIR WITH MESH, POSS OPEN [facility] [address] [facility]      left arm mass removed-- Dr. Pillo Stark        Medications :     Outpatient Medications Marked as Taking for the 10/27/21 encounter (Office Visit) with Rani Lozano MD   Medication Sig Dispense Refill    montelukast (SINGULAIR) 10 MG tablet Take 10 mg by mouth daily      metoprolol succinate (TOPROL XL) 25 MG extended release tablet Take 25 mg by mouth daily      omeprazole (PRILOSEC) 20 MG delayed release capsule Take 40 mg by mouth daily      insulin glargine (LANTUS) 100 UNIT/ML injection vial Inject into the skin nightly 42 in morning 32 at night      insulin lispro (HUMALOG) 100 UNIT/ML injection vial Inject into the skin 3 times daily (before meals)      tiZANidine (ZANAFLEX) 4 MG tablet Take 4 mg by mouth every 6 hours as needed      ondansetron (ZOFRAN-ODT) 4 MG disintegrating tablet Take 4 mg by mouth every 8 hours as needed for Nausea or Vomiting      acetaminophen (TYLENOL) 325 MG tablet Take 650 mg by mouth every 6 hours as needed for Pain      ibuprofen (ADVIL;MOTRIN) 200 MG tablet Take 200 mg by mouth every 6 hours as needed for Pain         Vitals     BP (!) 130/93 (Site: Left Upper Arm, Position: Sitting, Cuff Size: Medium Adult)   Pulse 83  Wt Readings from Last 3 Encounters:   09/22/21 245 lb (111.1 kg)   09/15/21 250 lb (113.4 kg)   08/27/21 249 lb 8 oz (113.2 kg)        Physical Exam     General -- no distress  Lungs -- clear  Heart -- S1, S2 heard, JVD - no  Abdomen - soft, non-tender  Extremities -- no edema  CNS - awake and alert    Labs, Radiology and Tests    Labs -     7/21 10/21                  POtassium 4.5  3.8                 BUN 18  10                 Creatinine 1.72  0.99                 eGFR 51  99                                       UPCR + 100                  UMCR                                              CT Scan -- 8/21  Kidneys are normal      Other : old  lab data have been reviewed and noted that patient's creatinine couple of months ago is around 0.96 and in earlier in July it was 1.26     Assessment    1. Renal -patient may be having chronic kidney disease stage II due to the presence of microalbuminuria. However  -His last worsening creatinine is most likely due to possible rhabdomyolysis from the seizure. -creat improved back to baseline  2. Electrolytes appear to be within normal limits  3. Essential hypertension running well continue current medications  4. Hx of diabetes with possible diabetic nephropathy no protein in the urine  5. Seizure disorder-[facility] will be seeing [facility]  6. Meds reviewed discussed with the patient in detail  7. Follow-up in 6 months      Tests and orders placed this Encounter   No orders of the defined types were placed in this encounter. Tami May M.D  [facility].

## 2021-11-12 ENCOUNTER — INITIAL CONSULT (OUTPATIENT)
Dept: PULMONOLOGY | Age: 36
End: 2021-11-12
Payer: COMMERCIAL

## 2021-11-12 VITALS
WEIGHT: 257.6 LBS | SYSTOLIC BLOOD PRESSURE: 122 MMHG | HEIGHT: 74 IN | TEMPERATURE: 98.1 F | OXYGEN SATURATION: 98 % | BODY MASS INDEX: 33.06 KG/M2 | DIASTOLIC BLOOD PRESSURE: 86 MMHG | HEART RATE: 84 BPM

## 2021-11-12 DIAGNOSIS — R06.83 SNORING: Primary | ICD-10-CM

## 2021-11-12 DIAGNOSIS — G47.30 SLEEP APNEA, UNSPECIFIED TYPE: ICD-10-CM

## 2021-11-12 DIAGNOSIS — R56.9 NOCTURNAL SEIZURE (HCC): ICD-10-CM

## 2021-11-12 PROCEDURE — G8427 DOCREV CUR MEDS BY ELIG CLIN: HCPCS | Performed by: INTERNAL MEDICINE

## 2021-11-12 PROCEDURE — G8417 CALC BMI ABV UP PARAM F/U: HCPCS | Performed by: INTERNAL MEDICINE

## 2021-11-12 PROCEDURE — 99204 OFFICE O/P NEW MOD 45 MIN: CPT | Performed by: INTERNAL MEDICINE

## 2021-11-12 PROCEDURE — G8484 FLU IMMUNIZE NO ADMIN: HCPCS | Performed by: INTERNAL MEDICINE

## 2021-11-12 PROCEDURE — 1036F TOBACCO NON-USER: CPT | Performed by: INTERNAL MEDICINE

## 2021-11-12 RX ORDER — LANOLIN ALCOHOL/MO/W.PET/CERES
3 CREAM (GRAM) TOPICAL NIGHTLY PRN
Qty: 30 TABLET | Refills: 5 | Status: SHIPPED | OUTPATIENT
Start: 2021-11-12 | End: 2022-11-12

## 2021-11-12 NOTE — PATIENT INSTRUCTIONS
Recommendations/Plan:  -Stop Singulair. I did not see any clear indication for this medication at this time.  -Start patient on Melatonin 3mg PO daily at bed time PRN. He was instructed to not to drive any motor vehicles or operate heavy equipment after taking the pill until sleep symptoms are under control. He was detailed about mechanism of action of drug along with associated side effects. He agreed to take the risk and medication. He verbalizes understanding.  -Will schedule patient for polysomnogram in the sleep lab with a seizure montage.   -I had a discussion with patient regarding avialable treatment options for his sleep disorder breathing including but not limited to CPAP titration in the sleep lab Vs.Dental appliance placement with referral to a local dentist Vs other available surgical options including Uvulopalatopharyngoplasty, maxillomandibular ostomy and tracheostomy as last option. At the end of discussion, he is not decided on his treatment if he found to have obstructive sleep apnea at this time. 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 He was advised to start Chronotherapy to maintain desired sleep schedule. -He was educated about sleep restriction therapy and advised to practice to improve his insomnia. -He was educated about stimulus control therapy and advise to practice to improve his insomnia. -We will see Sabrina Ozuna back in 1week after the sleep study to go over the sleep study results and further management options.  -He was educated to practice good sleep hygiene practices. He was provided with a good sleep hygiene hand out. Osvaldo Brunson was advised to make earlier appointment with my clinic if he develops any worsening of sleep symptoms. He verbalizes understanding.  -Kit Seeds was advised to not to drive any motor vehicles or operate heavy equipment until his sleep symptoms are under good control. [address] understanding.  -He was advised to loose weight by controlling diet and doing exercise once cleared by his family physician. - Avery Allen was educated about my impression and plan. He verbalizes understanding.

## 2021-11-12 NOTE — PROGRESS NOTES
Chief Complaint: Pt here for new sleep consult referred by [facility] for nocturnal seizures, no prior studies.      Mallampati airway Class: 4  Neck Circumference: 18.75 inches    [place] sleepiness score 11/12/21: ***  SAQLI: ***

## 2022-04-08 ENCOUNTER — HOSPITAL ENCOUNTER (OUTPATIENT)
Dept: SLEEP CENTER | Age: 37
Discharge: HOME | End: 2022-04-10
Payer: COMMERCIAL

## 2022-04-08 DIAGNOSIS — R06.83 SNORING: ICD-10-CM

## 2022-04-08 DIAGNOSIS — G47.30 SLEEP APNEA, UNSPECIFIED TYPE: ICD-10-CM

## 2022-04-08 DIAGNOSIS — R56.9 NOCTURNAL SEIZURE (HCC): ICD-10-CM

## 2022-04-08 PROCEDURE — 95810 POLYSOM 6/> YRS 4/> PARAM: CPT

## 2022-04-11 LAB — STATUS: NORMAL

## 2022-04-14 NOTE — PROGRESS NOTES
[address] [place] [address]                               SLEEP STUDY REPORT    PATIENT NAME: Griffin Villalobos                       :        1985  MED REC NO:   [id number]                           ROOM:  ACCOUNT NO:   [de-identified]                           ADMIT DATE: 2022  PROVIDER:     Stefano Haider. MD Margo    DATE OF STUDY:  2022    REFERRING PROVIDER:  Mary Alice Mehta. Anne Roldan CNP    The patient's height is 74 inches, weight is 257 pounds with a BMI of  33. HISTORY:  The patient is a 24-year-old gentleman who was initially  evaluated by me on 2021. The patient was referred by Mary Alice Mehta. Anne Roldan CNP. The patient had an episode of unresponsiveness during  sleep in 2021. The patient was evaluated at [facility]. He was diagnosed with seizure disorder in . The patient is  currently off anti-seizure medications. The patient is scheduled for  sleep study to evaluate for sleep apnea and also nocturnal seizure with  seizure montage. He is suffering from hypersomnia. The patient had  associated comorbidities including essential hypertension, type 2  diabetes mellitus, and chronic back pain. METHODS:  The patient underwent digital polysomnography in compliance  with the standards and specifications from the AASM Manual including the  simultaneous recording of 3 EEG channels (F4-M1, C4-M1, and O2-M1 with  back up electrodes F3-M2, C3-M2, and O1-M2), 2 EOG channels (E1-M2, and  E2-M1,), EMG (chin, left & right leg), EKG, Nonin pulse oximetry with   less than 2 second averaging time, body position, airflow recorded by  oral-nasal thermal sensor and nasal air pressure transducer, plus  respiratory effort recorded by calibrated respiratory inductance  plethysmography (RIP), flow volume loop, sound and video.   Sleep staging  and scoring followed the standard put forth by the American Academy of  Sleep Medicine and utilized the 1B obstructive hypopnea event  desaturation of 4 percent or greater. INTERPRETATION:  This is a baseline sleep study with a seizure montage. The study was performed on 04/08/2022. The study was started at 09:46  p.m. and was terminated at 04:59 a.m. with a total recording time of  432.9 minutes, and sleep period time was 392 minutes. Total sleep time  was 270 minutes, and overall sleep efficiency was 62.4%. The sleep  onset latency was 40.9 minutes, and wake after sleep onset was 122  minutes. REM sleep latency was 115 minutes. SLEEP STAGING AND DISTRIBUTION SUMMARY:  Revealed the patient spent 31.5  minutes in stage I consisting of 11.7% of total sleep time, 123 minutes  in stage II consisting of 45.6%, 97 minutes in stage III consisting of  35.9%, 18.5 minutes in REM sleep consisting of 6.9% of total sleep time. RESPIRATORY EVENT ANALYSIS:  Revealed the patient had a total of 11  apneas. The 11 are obstructive in nature. The patient also had a total  of 15 hypopneas. All of them are obstructive in nature. The total  number of apneas and hypopneas recorded during the study was 26 with an  apnea-hypopnea index of 5.8. The patient's REM sleep apnea-hypopnea  index was 58.4. POSITION ANALYSIS:  Revealed the patient spent 168 minutes in supine  position, 102 minutes in right lateral position with a supine  apnea-hypopnea index of 8.2 whereas right lateral position  apnea-hypopnea index was 0. PERIODIC LIMB MOVEMENT ANALYSIS:  Revealed the patient did not have any  periodic limb movements. The patient had a total of 25 spontaneous  arousals with a spontaneous arousal index of 5.6. OXYGEN SATURATION MONITORING:  Revealed the patient had a maximum oxygen  desaturation to 88% with a mean oxygen saturation of 94.2%. The  patient's oxygen saturation remained more than 88% during the entire  sleep study.     The patient was found to have moderate snoring during the sleep study. No epileptiform activity was noted during the sleep study. IMPRESSION:  1. Mild obstructive sleep apnea with worsening of respiratory events  during REM sleep. 2.  Decreased amount of REM sleep limiting the interpretation of the  sleep study. 3.  Essential hypertension, on treatment with medications. 4.  Type 2 diabetes mellitus. 5.  GERD, on treatment with PPI. RECOMMENDATIONS:  The patient should be scheduled for followup in my  clinic as soon as possible to discuss about the sleep study findings for  further management. Thanks to [employer]. Aislinn Lynne CNP, for giving me this opportunity to  participate in the care of this pleasant gentleman.         Yoly Rogers MD    D: 04/11/2022 14:54:34       T: 04/12/2022 14:02:29     [email or web address]  Job#: [id number]     Doc#: [id number]    CC: Hydroxychloroquine Counseling:  I discussed with the patient that a baseline ophthalmologic exam is needed at the start of therapy and every year thereafter while on therapy. A CBC may also be warranted for monitoring.  The side effects of this medication were discussed with the patient, including but not limited to agranulocytosis, aplastic anemia, seizures, rashes, retinopathy, and liver toxicity. Patient instructed to call the office should any adverse effect occur.  The patient verbalized understanding of the proper use and possible adverse effects of Plaquenil.  All the patient's questions and concerns were addressed.

## 2022-05-29 LAB
BILIRUBIN, URINE: POSITIVE
BLOOD, URINE: POSITIVE
BUN BLDV-MCNC: 11 MG/DL
CALCIUM SERPL-MCNC: 9.2 MG/DL
CHLORIDE BLD-SCNC: 107 MMOL/L
CLARITY: CLEAR
CO2: 27 MMOL/L
COLOR: YELLOW
CREAT SERPL-MCNC: 0.97 MG/DL
GFR CALCULATED: 101
GLUCOSE BLD-MCNC: 116 MG/DL
GLUCOSE URINE: 150
KETONES, URINE: POSITIVE
LEUKOCYTE ESTERASE, URINE: NEGATIVE
NITRITE, URINE: NEGATIVE
PH UA: 7 (ref 4.5–8)
POTASSIUM SERPL-SCNC: 3.4 MMOL/L
PROTEIN UA: POSITIVE
SODIUM BLD-SCNC: 141 MMOL/L
SPECIFIC GRAVITY, URINE: 1.02
UROBILINOGEN, URINE: NORMAL

## 2022-05-30 LAB
CREATININE, URINE: 396
MICROALBUMIN/CREAT 24H UR: 2.1 MG/G{CREAT}
MICROALBUMIN/CREAT UR-RTO: 5

## 2023-02-02 PROBLEM — U07.0 VAPING-RELATED DISORDER: Status: ACTIVE | Noted: 2022-11-15

## 2023-02-02 PROBLEM — J30.9 ALLERGIC RHINITIS: Status: ACTIVE | Noted: 2020-03-25

## 2023-02-02 PROBLEM — I10 PRIMARY HYPERTENSION: Status: ACTIVE | Noted: 2021-08-04

## 2023-02-02 PROBLEM — E66.9 OBESITY (BMI 30.0-34.9): Status: ACTIVE | Noted: 2022-02-17

## 2023-02-07 ENCOUNTER — OFFICE VISIT (OUTPATIENT)
Dept: SURGERY | Age: 38
End: 2023-02-07
Payer: COMMERCIAL

## 2023-02-07 VITALS
RESPIRATION RATE: 20 BRPM | DIASTOLIC BLOOD PRESSURE: 70 MMHG | TEMPERATURE: 97.1 F | HEIGHT: 74 IN | WEIGHT: 232.6 LBS | OXYGEN SATURATION: 98 % | BODY MASS INDEX: 29.85 KG/M2 | SYSTOLIC BLOOD PRESSURE: 110 MMHG | HEART RATE: 70 BPM

## 2023-02-07 PROCEDURE — 1036F TOBACCO NON-USER: CPT | Performed by: SURGERY

## 2023-02-07 PROCEDURE — 3074F SYST BP LT 130 MM HG: CPT | Performed by: SURGERY

## 2023-02-07 PROCEDURE — G8484 FLU IMMUNIZE NO ADMIN: HCPCS | Performed by: SURGERY

## 2023-02-07 PROCEDURE — 3078F DIAST BP <80 MM HG: CPT | Performed by: SURGERY

## 2023-02-07 PROCEDURE — G8427 DOCREV CUR MEDS BY ELIG CLIN: HCPCS | Performed by: SURGERY

## 2023-02-07 PROCEDURE — 99203 OFFICE O/P NEW LOW 30 MIN: CPT | Performed by: SURGERY

## 2023-02-07 PROCEDURE — G8419 CALC BMI OUT NRM PARAM NOF/U: HCPCS | Performed by: SURGERY

## 2023-02-07 ASSESSMENT — ENCOUNTER SYMPTOMS
CHOKING: 0
RHINORRHEA: 0
CHEST TIGHTNESS: 0
VOMITING: 0
ABDOMINAL PAIN: 1
EYE DISCHARGE: 0
FACIAL SWELLING: 0
COLOR CHANGE: 0
EYE REDNESS: 0
BLOOD IN STOOL: 0
PHOTOPHOBIA: 0
ABDOMINAL DISTENTION: 0
BACK PAIN: 0
NAUSEA: 1
WHEEZING: 0
COUGH: 0
APNEA: 0
VOICE CHANGE: 0
SINUS PRESSURE: 0
STRIDOR: 0
CONSTIPATION: 0
TROUBLE SWALLOWING: 0
EYE PAIN: 0
SHORTNESS OF BREATH: 0
SORE THROAT: 0
DIARRHEA: 0
EYE ITCHING: 0
ANAL BLEEDING: 0
RECTAL PAIN: 0

## 2023-02-07 NOTE — PROGRESS NOTES
Subjective:      Patient ID: Kenneth Major is a 30 y.o. male. Chief Complaint   Patient presents with    Surgical Consult     Est patient- last seen 8/27/21 referred by Alex Hendrix CNP-abd pain, evaluate for hernia-US abdomen scheduled THE [facility] 2/10/23       HPI    Review of Systems   Constitutional:  Negative for activity change, appetite change, chills, diaphoresis, fatigue, fever and unexpected weight change. HENT:  Negative for congestion, dental problem, drooling, ear discharge, ear pain, facial swelling, hearing loss, mouth sores, nosebleeds, postnasal drip, rhinorrhea, sinus pressure, sneezing, sore throat, tinnitus, trouble swallowing and voice change. Eyes:  Negative for photophobia, pain, discharge, redness, itching and visual disturbance. Respiratory:  Negative for apnea, cough, choking, chest tightness, shortness of breath, wheezing and stridor. Cardiovascular:  Negative for chest pain, palpitations and leg swelling. Gastrointestinal:  Positive for abdominal pain and nausea. Negative for abdominal distention, anal bleeding, blood in stool, constipation, diarrhea, rectal pain and vomiting. Endocrine: Negative for cold intolerance, heat intolerance, polydipsia, polyphagia and polyuria. Genitourinary:  Negative for decreased urine volume, difficulty urinating, dysuria, enuresis, flank pain, frequency, genital sores, hematuria and urgency. Musculoskeletal:  Negative for arthralgias, back pain, gait problem, joint swelling, myalgias, neck pain and neck stiffness. Skin:  Negative for color change, pallor, rash and wound. Allergic/Immunologic: Negative for environmental allergies, food allergies and immunocompromised state. Neurological:  Negative for dizziness, tremors, seizures, syncope, facial asymmetry, speech difficulty, weakness, light-headedness, numbness and headaches. Hematological:  Negative for adenopathy. Does not bruise/bleed easily.    Psychiatric/Behavioral:  Negative for agitation, behavioral problems, confusion, decreased concentration, dysphoric mood, hallucinations, self-injury, sleep disturbance and suicidal ideas. The patient is not nervous/anxious and is not hyperactive.     /70 (Site: Right Upper Arm, Position: Sitting, Cuff Size: Medium Adult)   Pulse 70   Temp 97.1 °F (36.2 °C) (Temporal)   Resp 20   Ht 6' 2\" (1.88 m)   Wt 232 lb 9.6 oz (105.5 kg)   SpO2 98%   BMI 29.86 kg/m²     Objective:   Physical Exam    Assessment:            Plan:              Kalie Echeverria LPN

## 2023-02-07 NOTE — LETTER
Lorene Michelle DO  [address] [address] [address] [facility] [address]  [place] [address]  [phone]     Pt Name: Otoniel Middleton Record Number: [id number]  Date of Birth 1985   Today's Date: 2/7/2023    Molly Marrufo was seen in consultation in the office today. My assessment and plans are listed below. ASSESSMENT      Diagnosis Orders   1. Umbilical granuloma          Past Medical History:   Diagnosis Date    Hypertension     Seasonal allergies     Seizures (HCC)     Type 2 diabetes mellitus (HCC)          PLANS:   1. Granuloma treated with silver nitrate  2. Pt has an US already ordered to further evaluate for a possible recurrent incisional hernia that is currently no palpable. 3. F/u after testing completed. If I can provide any additional assistance or you have any concerns, please feel free to contact me. Thank you for allowing to participate in the care of your patients. Sincerely,      Wai Jason.  Adelaida Ferrara

## 2023-02-07 NOTE — PROGRESS NOTES
Linda Diaz. [facility], [address] [place] [address]  [phone]  New Patient Evaluation in Office    Pt Name: Rebekah Tubbs  Date of Birth 1985   Today's Date: 2/7/2023  Medical Record Number: [id number]  Referring Provider: No ref. provider found  Primary Care Provider: MARY Collier - NP  Chief Complaint   Patient presents with    Surgical Consult     Est patient- last seen 8/27/21 referred by [place] Force, CNP-abd pain, evaluate for hernia-US abdomen scheduled THE [facility] 2/10/23     ASSESSMENT       Diagnosis Orders   1. Umbilical granuloma          Past Medical History:   Diagnosis Date    Hypertension     Seasonal allergies     Seizures ([address]     Type 2 diabetes mellitus ([address]           PLANS      Granuloma treated with silver nitrate  Pt has an US already ordered to further evaluate for a possible recurrent incisional hernia that is currently no palpable. F/u after testing completed. Heriberto Mead is a 30 y.o. male seen in the consultation for evaluation of a possible recurrentincisional hernia. Symptoms were first noted 1 week ago and have progressed to a point and plateaued since that time. The pain is sharp, intermittent, mild in severity. It is aggravated by Valsalva maneuvers and palliated by rest. He has bloody serous drainage from his umbilicus. He denies signs or symptoms of incarceration or strangulation. He denies previous wound infection or dehiscence. He had a previous incisional hernia repair with mesh 8/11/21.   Past Medical History  Past Medical History:   Diagnosis Date    Hypertension     Seasonal allergies     Seizures ([address]     Type 2 diabetes mellitus ([address]      Past Surgical History  Past Surgical History:   Procedure Laterality Date    FOOT SURGERY Right     benign mass    GASTRIC FUNDOPLICATION  14/84/5100    Dr. Renaldo Carvajal (in care everywhere)    HERNIA REPAIR N/A 8/11/2021    LAPAROSCOPIC INCISIONAL HERNIA REPAIR WITH MESH, POSS OPEN performed by Janet Salgado DO at [facility]      left arm mass removed-- Dr. Nathaly Sandy     Medications  Current Outpatient Medications   Medication Sig Dispense Refill    montelukast (SINGULAIR) 10 MG tablet Take 10 mg by mouth daily      metoprolol succinate (TOPROL XL) 25 MG extended release tablet Take 25 mg by mouth daily      insulin glargine (LANTUS) 100 UNIT/ML injection vial Inject into the skin nightly 42 in morning 32 at night      insulin lispro (HUMALOG) 100 UNIT/ML injection vial Inject into the skin 3 times daily (before meals)      melatonin 3 MG TABS tablet Take 1 tablet by mouth nightly as needed (Circadian sleep phase delay syndrome.) 30 tablet 5     No current facility-administered medications for this visit. Allergies  is allergic to dust mite extract, gabapentin, mixed grasses, and molds & smuts. Family History  family history includes Diabetes in his mother; Irritable Bowel Syndrome in his sister; Migraines in his sister; No Known Problems in his father. Social History   reports that he has never smoked. He has never used smokeless tobacco. He reports that he does not drink alcohol and does not use drugs.   Health Screening Exams  Health Maintenance   Topic Date Due    COVID-19 Vaccine (1) Never done    Varicella vaccine (1 of 2 - 2-dose childhood series) Never done    Pneumococcal 0-64 years Vaccine (1 - PCV) Never done    Diabetic foot exam  Never done    Depression Monitoring  Never done    Diabetic retinal exam  Never done    Hepatitis C screen  Never done    Hepatitis B vaccine (1 of 3 - Risk 3-dose series) Never done    DTaP/Tdap/Td vaccine (1 - Tdap) Never done    Lipids  07/30/2020    Flu vaccine (1) Never done    GFR test (Diabetes, CKD 3-4, OR last GFR 15-59)  05/29/2023    Diabetic Alb to Cr ratio (uACR) test  05/30/2023    A1C test (Diabetic or Prediabetic)  09/08/2023    HIV screen  Completed    Hepatitis A vaccine Aged Out    Hib vaccine  Aged Out    Meningococcal (ACWY) vaccine  Aged Out     Review of Systems  Unless otherwise stated in [facility] was completed and is unremarkable     OBJECTIVE    VITALS:  height is 6' 2\" (1.88 m) and weight is 232 lb 9.6 oz (105.5 kg). His temporal temperature is 97.1 °F (36.2 °C). His blood pressure is 110/70 and his pulse is 70. His respiration is 20 and oxygen saturation is 98%. Pain Score:   7 Body mass index is 29.86 kg/m². CONSTITUTIONAL: Alert and oriented times 3, no acute distress and cooperative to examination with proper mood and affect. SKIN: Skin color, texture, turgor normal. No rashes or lesions. LYMPH: no cervical nodes, no inguinal nodes  HEENT: Head is normocephalic, atraumatic. EOMI, PERRLA. NECK: Supple, symmetrical, trachea midline, no adenopathy, thyroid symmetric, not enlarged and no tenderness, skin normal.  CHEST/LUNGS: chest symmetric with normal A/P diameter, normal respiratory rate and rhythm, lungs clear to auscultation without wheezes, rales or rhonchi. No accessory muscle use. Scars None   CARDIOVASCULAR: Heart sounds are normal.  Regular rate and rhythm without murmur, gallop or rub. Normal S1 and S2. Carotid and femoral pulses 2+/4 and equal bilaterally. ABDOMEN: Normal shape. Laparoscopic scar(s) present. Normal bowel sounds. No bruits. Soft, nontender, nondistended, no masses or organomegaly. Unable to palpate a hernia. Percussion: Normal without hepatosplenomegally. Mini Will Umbilical granuloma treated with silver nitrate. RECTAL: deferred, not clinically indicated  NEUROLOGIC: There are no focalizing motor or sensory deficits. CN II-XII are grossly intact. Rilla Balk EXTREMITIES: no cyanosis, no clubbing, and no edema. Thank you for the interesting evaluation. Further recommendations as listed above.        Electronically signed by Chevy Blancas DO on 2/7/2023 at 2:24 PM

## 2023-03-02 ENCOUNTER — HOSPITAL ENCOUNTER (OUTPATIENT)
Age: 38
Setting detail: SPECIMEN
Discharge: HOME | End: 2023-03-02

## 2023-03-03 LAB
ABSOLUTE EOS #: 0.3 K/UL (ref 0–0.44)
ABSOLUTE IMMATURE GRANULOCYTE: 0.04 K/UL (ref 0–0.3)
ABSOLUTE LYMPH #: 2.16 K/UL (ref 1.1–3.7)
ABSOLUTE MONO #: 0.68 K/UL (ref 0.1–1.2)
ALBUMIN SERPL-MCNC: 4.1 G/DL (ref 3.5–5.2)
ALBUMIN/GLOBULIN RATIO: 1.7 (ref 1–2.5)
ALP SERPL-CCNC: 89 U/L (ref 40–129)
ALT SERPL-CCNC: 21 U/L (ref 5–41)
ANION GAP SERPL CALCULATED.3IONS-SCNC: 10 MMOL/L (ref 9–17)
AST SERPL-CCNC: 24 U/L
BASOPHILS # BLD: 1 % (ref 0–2)
BASOPHILS ABSOLUTE: 0.06 K/UL (ref 0–0.2)
BILIRUB SERPL-MCNC: 1.3 MG/DL (ref 0.3–1.2)
BUN SERPL-MCNC: 11 MG/DL (ref 6–20)
CALCIUM SERPL-MCNC: 8.9 MG/DL (ref 8.6–10.4)
CHLORIDE SERPL-SCNC: 102 MMOL/L (ref 98–107)
CHOLEST SERPL-MCNC: 154 MG/DL
CHOLESTEROL/HDL RATIO: 4.5
CO2 SERPL-SCNC: 24 MMOL/L (ref 20–31)
CREAT SERPL-MCNC: 0.92 MG/DL (ref 0.7–1.2)
EOSINOPHILS RELATIVE PERCENT: 4 % (ref 1–4)
EST. AVERAGE GLUCOSE BLD GHB EST-MCNC: 174 MG/DL
GFR SERPL CREATININE-BSD FRML MDRD: >60 ML/MIN/1.73M2
GLUCOSE SERPL-MCNC: 285 MG/DL (ref 70–99)
HBA1C MFR BLD: 7.7 % (ref 4–6)
HCT VFR BLD AUTO: 46 % (ref 40.7–50.3)
HDLC SERPL-MCNC: 34 MG/DL
HGB BLD-MCNC: 15.7 G/DL (ref 13–17)
IMMATURE GRANULOCYTES: 1 %
LDLC SERPL CALC-MCNC: 98 MG/DL (ref 0–130)
LYMPHOCYTES # BLD: 26 % (ref 24–43)
MCH RBC QN AUTO: 31.7 PG (ref 25.2–33.5)
MCHC RBC AUTO-ENTMCNC: 34.1 G/DL (ref 28.4–34.8)
MCV RBC AUTO: 92.9 FL (ref 82.6–102.9)
MONOCYTES # BLD: 8 % (ref 3–12)
NRBC AUTOMATED: 0 PER 100 WBC
PDW BLD-RTO: 12.6 % (ref 11.8–14.4)
PLATELET # BLD AUTO: 335 K/UL (ref 138–453)
PMV BLD AUTO: 11.4 FL (ref 8.1–13.5)
POTASSIUM SERPL-SCNC: 4.2 MMOL/L (ref 3.7–5.3)
PROT SERPL-MCNC: 6.5 G/DL (ref 6.4–8.3)
RBC # BLD: 4.95 M/UL (ref 4.21–5.77)
SEG NEUTROPHILS: 60 % (ref 36–65)
SEGMENTED NEUTROPHILS ABSOLUTE COUNT: 5.18 K/UL (ref 1.5–8.1)
SODIUM SERPL-SCNC: 136 MMOL/L (ref 135–144)
TRIGL SERPL-MCNC: 111 MG/DL
TSH SERPL-ACNC: 1.48 UIU/ML (ref 0.3–5)
WBC # BLD AUTO: 8.4 K/UL (ref 3.5–11.3)

## (undated) DEVICE — SUTURE VCRL + SZ 0 L27IN ABSRB VLT L26MM UR-6 5/8 CIR VCP603H

## (undated) DEVICE — TROCAR: Brand: KII FIOS FIRST ENTRY

## (undated) DEVICE — Z DUPLICATE USE 2431315 SET INSUF TBNG HI FLO W/ SMK EVAC FOR PNEUMOCLEAR

## (undated) DEVICE — STRIP SKIN CLSR W0.25XL4IN WHT SPUNBOUND FBR NYL HI ADH

## (undated) DEVICE — UNIVERSAL MONOPOLAR LAPAROSCOPIC CABLE 10FT, 4MM PIN CONNECTOR: Brand: CONMED

## (undated) DEVICE — GLOVE SURG SZ 75 L12IN FNGR THK94MIL TRNSLUC YEL LTX

## (undated) DEVICE — GENERAL LAPAROSCOPY PACK-LF: Brand: MEDLINE INDUSTRIES, INC.